# Patient Record
Sex: FEMALE | Race: WHITE | NOT HISPANIC OR LATINO | Employment: OTHER | URBAN - METROPOLITAN AREA
[De-identification: names, ages, dates, MRNs, and addresses within clinical notes are randomized per-mention and may not be internally consistent; named-entity substitution may affect disease eponyms.]

---

## 2024-05-15 ENCOUNTER — APPOINTMENT (OUTPATIENT)
Dept: RADIOLOGY | Facility: HOSPITAL | Age: 89
DRG: 690 | End: 2024-05-15
Payer: MEDICARE

## 2024-05-15 ENCOUNTER — HOSPITAL ENCOUNTER (INPATIENT)
Facility: HOSPITAL | Age: 89
LOS: 3 days | Discharge: HOME | DRG: 690 | End: 2024-05-18
Attending: EMERGENCY MEDICINE | Admitting: FAMILY MEDICINE
Payer: MEDICARE

## 2024-05-15 DIAGNOSIS — L02.419 THIGH ABSCESS: ICD-10-CM

## 2024-05-15 DIAGNOSIS — N39.0 URINARY TRACT INFECTION WITHOUT HEMATURIA, SITE UNSPECIFIED: ICD-10-CM

## 2024-05-15 DIAGNOSIS — R41.82 ALTERED MENTAL STATUS, UNSPECIFIED ALTERED MENTAL STATUS TYPE: Primary | ICD-10-CM

## 2024-05-15 LAB
ALBUMIN SERPL BCP-MCNC: 3.6 G/DL (ref 3.4–5)
ALP SERPL-CCNC: 71 U/L (ref 33–136)
ALT SERPL W P-5'-P-CCNC: 32 U/L (ref 7–45)
ANION GAP SERPL CALC-SCNC: 14 MMOL/L (ref 10–20)
APPEARANCE UR: ABNORMAL
AST SERPL W P-5'-P-CCNC: 36 U/L (ref 9–39)
BACTERIA #/AREA URNS AUTO: ABNORMAL /HPF
BASOPHILS # BLD AUTO: 0.06 X10*3/UL (ref 0–0.1)
BASOPHILS NFR BLD AUTO: 0.5 %
BILIRUB SERPL-MCNC: 0.9 MG/DL (ref 0–1.2)
BILIRUB UR STRIP.AUTO-MCNC: NEGATIVE MG/DL
BUN SERPL-MCNC: 19 MG/DL (ref 6–23)
CALCIUM SERPL-MCNC: 8.1 MG/DL (ref 8.6–10.3)
CARDIAC TROPONIN I PNL SERPL HS: 11 NG/L (ref 0–13)
CARDIAC TROPONIN I PNL SERPL HS: 15 NG/L (ref 0–13)
CHLORIDE SERPL-SCNC: 103 MMOL/L (ref 98–107)
CO2 SERPL-SCNC: 26 MMOL/L (ref 21–32)
COLOR UR: ABNORMAL
CREAT SERPL-MCNC: 0.81 MG/DL (ref 0.5–1.05)
EGFRCR SERPLBLD CKD-EPI 2021: 68 ML/MIN/1.73M*2
EOSINOPHIL # BLD AUTO: 0.12 X10*3/UL (ref 0–0.4)
EOSINOPHIL NFR BLD AUTO: 0.9 %
ERYTHROCYTE [DISTWIDTH] IN BLOOD BY AUTOMATED COUNT: 15.1 % (ref 11.5–14.5)
GLUCOSE BLD MANUAL STRIP-MCNC: 172 MG/DL (ref 74–99)
GLUCOSE SERPL-MCNC: 133 MG/DL (ref 74–99)
GLUCOSE UR STRIP.AUTO-MCNC: NORMAL MG/DL
HCT VFR BLD AUTO: 38.8 % (ref 36–46)
HGB BLD-MCNC: 12.5 G/DL (ref 12–16)
IMM GRANULOCYTES # BLD AUTO: 0.05 X10*3/UL (ref 0–0.5)
IMM GRANULOCYTES NFR BLD AUTO: 0.4 % (ref 0–0.9)
INR PPP: 0.9 (ref 0.9–1.1)
KETONES UR STRIP.AUTO-MCNC: NEGATIVE MG/DL
LACTATE SERPL-SCNC: 1.6 MMOL/L (ref 0.4–2)
LEUKOCYTE ESTERASE UR QL STRIP.AUTO: ABNORMAL
LYMPHOCYTES # BLD AUTO: 3.09 X10*3/UL (ref 0.8–3)
LYMPHOCYTES NFR BLD AUTO: 23.7 %
MCH RBC QN AUTO: 28.8 PG (ref 26–34)
MCHC RBC AUTO-ENTMCNC: 32.2 G/DL (ref 32–36)
MCV RBC AUTO: 89 FL (ref 80–100)
MONOCYTES # BLD AUTO: 0.89 X10*3/UL (ref 0.05–0.8)
MONOCYTES NFR BLD AUTO: 6.8 %
NEUTROPHILS # BLD AUTO: 8.81 X10*3/UL (ref 1.6–5.5)
NEUTROPHILS NFR BLD AUTO: 67.7 %
NITRITE UR QL STRIP.AUTO: ABNORMAL
NRBC BLD-RTO: 0 /100 WBCS (ref 0–0)
PH UR STRIP.AUTO: 6 [PH]
PLATELET # BLD AUTO: 292 X10*3/UL (ref 150–450)
POTASSIUM SERPL-SCNC: 4.7 MMOL/L (ref 3.5–5.3)
PROT SERPL-MCNC: 7 G/DL (ref 6.4–8.2)
PROT UR STRIP.AUTO-MCNC: ABNORMAL MG/DL
PROTHROMBIN TIME: 10.2 SECONDS (ref 9.8–12.8)
RBC # BLD AUTO: 4.34 X10*6/UL (ref 4–5.2)
RBC # UR STRIP.AUTO: ABNORMAL /UL
RBC #/AREA URNS AUTO: >20 /HPF
SODIUM SERPL-SCNC: 138 MMOL/L (ref 136–145)
SP GR UR STRIP.AUTO: 1.04
UROBILINOGEN UR STRIP.AUTO-MCNC: NORMAL MG/DL
WBC # BLD AUTO: 13 X10*3/UL (ref 4.4–11.3)
WBC #/AREA URNS AUTO: >50 /HPF
WBC CLUMPS #/AREA URNS AUTO: ABNORMAL /HPF

## 2024-05-15 PROCEDURE — 2550000001 HC RX 255 CONTRASTS: Performed by: EMERGENCY MEDICINE

## 2024-05-15 PROCEDURE — 1100000001 HC PRIVATE ROOM DAILY

## 2024-05-15 PROCEDURE — 84484 ASSAY OF TROPONIN QUANT: CPT | Mod: 91 | Performed by: EMERGENCY MEDICINE

## 2024-05-15 PROCEDURE — 36415 COLL VENOUS BLD VENIPUNCTURE: CPT | Performed by: EMERGENCY MEDICINE

## 2024-05-15 PROCEDURE — 87040 BLOOD CULTURE FOR BACTERIA: CPT | Mod: 91,AHULAB | Performed by: EMERGENCY MEDICINE

## 2024-05-15 PROCEDURE — 73701 CT LOWER EXTREMITY W/DYE: CPT | Mod: LEFT SIDE | Performed by: STUDENT IN AN ORGANIZED HEALTH CARE EDUCATION/TRAINING PROGRAM

## 2024-05-15 PROCEDURE — 82947 ASSAY GLUCOSE BLOOD QUANT: CPT

## 2024-05-15 PROCEDURE — 73701 CT LOWER EXTREMITY W/DYE: CPT | Mod: 52,LT

## 2024-05-15 PROCEDURE — 83605 ASSAY OF LACTIC ACID: CPT | Performed by: EMERGENCY MEDICINE

## 2024-05-15 PROCEDURE — 99285 EMERGENCY DEPT VISIT HI MDM: CPT | Mod: 25

## 2024-05-15 PROCEDURE — 70450 CT HEAD/BRAIN W/O DYE: CPT | Performed by: RADIOLOGY

## 2024-05-15 PROCEDURE — 85025 COMPLETE CBC W/AUTO DIFF WBC: CPT | Performed by: EMERGENCY MEDICINE

## 2024-05-15 PROCEDURE — 81001 URINALYSIS AUTO W/SCOPE: CPT | Performed by: EMERGENCY MEDICINE

## 2024-05-15 PROCEDURE — 81003 URINALYSIS AUTO W/O SCOPE: CPT | Performed by: EMERGENCY MEDICINE

## 2024-05-15 PROCEDURE — 85610 PROTHROMBIN TIME: CPT | Performed by: EMERGENCY MEDICINE

## 2024-05-15 PROCEDURE — 80053 COMPREHEN METABOLIC PANEL: CPT | Performed by: EMERGENCY MEDICINE

## 2024-05-15 PROCEDURE — 70450 CT HEAD/BRAIN W/O DYE: CPT

## 2024-05-15 PROCEDURE — 2500000004 HC RX 250 GENERAL PHARMACY W/ HCPCS (ALT 636 FOR OP/ED): Performed by: EMERGENCY MEDICINE

## 2024-05-15 PROCEDURE — 73701 CT LOWER EXTREMITY W/DYE: CPT | Mod: RT

## 2024-05-15 PROCEDURE — 84484 ASSAY OF TROPONIN QUANT: CPT | Performed by: EMERGENCY MEDICINE

## 2024-05-15 RX ORDER — VANCOMYCIN HYDROCHLORIDE 750 MG/150ML
750 INJECTION, SOLUTION INTRAVENOUS ONCE
Status: COMPLETED | OUTPATIENT
Start: 2024-05-15 | End: 2024-05-15

## 2024-05-15 RX ADMIN — IOHEXOL 75 ML: 350 INJECTION, SOLUTION INTRAVENOUS at 17:10

## 2024-05-15 RX ADMIN — PIPERACILLIN SODIUM AND TAZOBACTAM SODIUM 4.5 G: 4; .5 INJECTION, SOLUTION INTRAVENOUS at 21:18

## 2024-05-15 RX ADMIN — VANCOMYCIN HYDROCHLORIDE 750 MG: 750 INJECTION, SOLUTION INTRAVENOUS at 21:59

## 2024-05-15 ASSESSMENT — COLUMBIA-SUICIDE SEVERITY RATING SCALE - C-SSRS
2. HAVE YOU ACTUALLY HAD ANY THOUGHTS OF KILLING YOURSELF?: NO
1. IN THE PAST MONTH, HAVE YOU WISHED YOU WERE DEAD OR WISHED YOU COULD GO TO SLEEP AND NOT WAKE UP?: NO
6. HAVE YOU EVER DONE ANYTHING, STARTED TO DO ANYTHING, OR PREPARED TO DO ANYTHING TO END YOUR LIFE?: NO

## 2024-05-15 ASSESSMENT — PAIN - FUNCTIONAL ASSESSMENT: PAIN_FUNCTIONAL_ASSESSMENT: 0-10

## 2024-05-15 NOTE — ED TRIAGE NOTES
PT TO ED FROM HOME WITH C/O NEW ONSET SLURRED SPEECH, ALTERED MENTAL STATUS, AND SYNCOPAL EPISODE TODAY. LKW LAST PM. PT RECENTLY TREATED FOR UTI 1 WEEK PRIOR COMPLETING ABX MEDICATIONS.

## 2024-05-15 NOTE — ED PROVIDER NOTES
HPI   Chief Complaint   Patient presents with    SLURRED SPEACH    Syncope       HPI  Patient is a 89-year-old female with past medical history significant for recurrent UTIs, thyroid disease, dementia who is minimally verbal at baseline and presented to the emergency room with a chief complaint of difficulty finding her words and change in mental status.  The caregiver provides most of the history and states that patient speaks very little but when she does she can usually understand that things she is saying.  This morning she stated that her speech was slurred but in reality patient was not making any sense with the words that were coming out.  Last known well was last night.  She had a UTI a week ago and has since finished her antibiotics.  Additionally, she noted swelling to the left medial thigh that she believes to be new.  Denies any other symptoms at this time as best that she can convey.      PMHx: As above  PSHx: Denies pertinent  FamilyHx: Denies  SocialHx: Denies pertinent  Allergies: Per EMR  Medications: See Medication Reconciliation     ROS  As above otherwise denies      Physical Exam    GENERAL: Awake and Alert, No Acute Distress  HEENT: AT/NC, PERRL, EOMI, Normal Oropharynx, No Signs of Dehydration  NECK: Normal Inspection, No JVD  CARDIOVASCULAR: RRR, No M/R/G  RESPIRATORY: CTA Bilaterally, No Wheezes, Rales or Rhonchi, Chest Wall Non-tender  ABDOMEN: Soft, non-tender abdomen, Normal Bowel Sounds, No Distention  BACK: No CVA Tenderness  SKIN: Normal Color, Warm, Dry, No Rashes   EXTREMITIES: Does have a swelling to the medial aspect of the left thigh that does not appear to be tender.  Non-Tender, Full ROM, No Pedal Edema  NEURO: Nonverbal but reaches for her doll when removed.  Not following commands.    Nursing Assessment and Vitals Reviewed    EKG on arrival showed a normal sinus rhythm at 88 bpm.  There are T wave inversions in lead V2 and III.  There is left axis deviation.    Medical  Decision  Patient is a 89-year-old female with past medical history significant for recurrent UTIs, thyroid disease, dementia who is minimally verbal at baseline and presented to the emergency room with a chief complaint of difficulty finding her words and change in mental status.  The caregiver provides most of the history and states that patient speaks very little but when she does she can usually understand that things she is saying.  This morning she stated that her speech was slurred but in reality patient was not making any sense with the words that were coming out.  Last known well was last night.  She had a UTI a week ago and has since finished her antibiotics.  Additionally, she noted swelling to the left medial thigh that she believes to be new.  Denies any other symptoms at this time as best that she can convey.    Evaluated patient in triage room 3 at 1350 at which time a stroke alert was called.  Patient is nonverbal but does reach for her doll when it is removed from her side.  She is not following commands but inconsistently moves the lower extremities.  After being taken to the room I did evaluate patient thigh and it appears to have some swelling without any warmth, erythema or tenderness to palpation.  CT is ordered and currently pending.  Patient is not a candidate for TNK at this time as her last known well exceeds treatment window.    Workup for patient included labs that revealed a troponin of 15 with a delta at 11.  She has leukocytosis with a left shift.  CT head showed no acute emergent findings.  Patient is signed out to oncoming physician pending CT of the left lower extremity to evaluate for notable swelling.  Anticipate she will be admitted for further workup and management.                            [place] Coma Scale Score: 14                     Patient History   No past medical history on file.  No past surgical history on file.  No family history on file.  Social History      Tobacco Use    Smoking status: Not on file    Smokeless tobacco: Not on file   Substance Use Topics    Alcohol use: Not on file    Drug use: Not on file       Physical Exam   ED Triage Vitals   Temperature Heart Rate Respirations BP   05/15/24 1353 05/15/24 1353 05/15/24 1353 05/15/24 1353   37 °C (98.6 °F) 78 17 129/90      Pulse Ox Temp src Heart Rate Source Patient Position   05/15/24 1353 -- 05/15/24 1501 --   100 %  Monitor       BP Location FiO2 (%)     -- --             Physical Exam    ED Course & MDM   Diagnoses as of 05/20/24 0911   Altered mental status, unspecified altered mental status type   Urinary tract infection without hematuria, site unspecified   Thigh abscess       Medical Decision Making      Procedure  Procedures     Yanet Perry MD  05/20/24 0912

## 2024-05-16 PROBLEM — L02.419 THIGH ABSCESS: Status: ACTIVE | Noted: 2024-05-16

## 2024-05-16 LAB
ANION GAP SERPL CALC-SCNC: 13 MMOL/L (ref 10–20)
BUN SERPL-MCNC: 16 MG/DL (ref 6–23)
CALCIUM SERPL-MCNC: 7.9 MG/DL (ref 8.6–10.3)
CHLORIDE SERPL-SCNC: 106 MMOL/L (ref 98–107)
CO2 SERPL-SCNC: 23 MMOL/L (ref 21–32)
CREAT SERPL-MCNC: 0.88 MG/DL (ref 0.5–1.05)
CRP SERPL-MCNC: 4.07 MG/DL
EGFRCR SERPLBLD CKD-EPI 2021: 62 ML/MIN/1.73M*2
ERYTHROCYTE [DISTWIDTH] IN BLOOD BY AUTOMATED COUNT: 15.2 % (ref 11.5–14.5)
ERYTHROCYTE [SEDIMENTATION RATE] IN BLOOD BY WESTERGREN METHOD: 49 MM/H (ref 0–30)
GLUCOSE SERPL-MCNC: 94 MG/DL (ref 74–99)
HCT VFR BLD AUTO: 35.4 % (ref 36–46)
HGB BLD-MCNC: 11.3 G/DL (ref 12–16)
MCH RBC QN AUTO: 28.9 PG (ref 26–34)
MCHC RBC AUTO-ENTMCNC: 31.9 G/DL (ref 32–36)
MCV RBC AUTO: 91 FL (ref 80–100)
NRBC BLD-RTO: 0.7 /100 WBCS (ref 0–0)
PLATELET # BLD AUTO: 247 X10*3/UL (ref 150–450)
POTASSIUM SERPL-SCNC: 4.2 MMOL/L (ref 3.5–5.3)
RBC # BLD AUTO: 3.91 X10*6/UL (ref 4–5.2)
SODIUM SERPL-SCNC: 138 MMOL/L (ref 136–145)
WBC # BLD AUTO: 15.1 X10*3/UL (ref 4.4–11.3)

## 2024-05-16 PROCEDURE — 2500000004 HC RX 250 GENERAL PHARMACY W/ HCPCS (ALT 636 FOR OP/ED): Performed by: PHARMACIST

## 2024-05-16 PROCEDURE — 2500000006 HC RX 250 W HCPCS SELF ADMINISTERED DRUGS (ALT 637 FOR ALL PAYERS): Mod: MUE | Performed by: FAMILY MEDICINE

## 2024-05-16 PROCEDURE — 36415 COLL VENOUS BLD VENIPUNCTURE: CPT | Performed by: NURSE PRACTITIONER

## 2024-05-16 PROCEDURE — 80048 BASIC METABOLIC PNL TOTAL CA: CPT | Performed by: FAMILY MEDICINE

## 2024-05-16 PROCEDURE — 85027 COMPLETE CBC AUTOMATED: CPT | Performed by: FAMILY MEDICINE

## 2024-05-16 PROCEDURE — 92610 EVALUATE SWALLOWING FUNCTION: CPT | Mod: GN

## 2024-05-16 PROCEDURE — 1100000001 HC PRIVATE ROOM DAILY

## 2024-05-16 PROCEDURE — 2500000004 HC RX 250 GENERAL PHARMACY W/ HCPCS (ALT 636 FOR OP/ED): Performed by: FAMILY MEDICINE

## 2024-05-16 PROCEDURE — 36415 COLL VENOUS BLD VENIPUNCTURE: CPT | Performed by: FAMILY MEDICINE

## 2024-05-16 PROCEDURE — 99221 1ST HOSP IP/OBS SF/LOW 40: CPT

## 2024-05-16 PROCEDURE — C9113 INJ PANTOPRAZOLE SODIUM, VIA: HCPCS | Performed by: FAMILY MEDICINE

## 2024-05-16 PROCEDURE — 2500000001 HC RX 250 WO HCPCS SELF ADMINISTERED DRUGS (ALT 637 FOR MEDICARE OP): Performed by: FAMILY MEDICINE

## 2024-05-16 PROCEDURE — 86140 C-REACTIVE PROTEIN: CPT | Performed by: NURSE PRACTITIONER

## 2024-05-16 PROCEDURE — 99222 1ST HOSP IP/OBS MODERATE 55: CPT | Performed by: STUDENT IN AN ORGANIZED HEALTH CARE EDUCATION/TRAINING PROGRAM

## 2024-05-16 PROCEDURE — 85652 RBC SED RATE AUTOMATED: CPT | Performed by: NURSE PRACTITIONER

## 2024-05-16 RX ORDER — SODIUM CHLORIDE, SODIUM LACTATE, POTASSIUM CHLORIDE, CALCIUM CHLORIDE 600; 310; 30; 20 MG/100ML; MG/100ML; MG/100ML; MG/100ML
75 INJECTION, SOLUTION INTRAVENOUS CONTINUOUS
Status: ACTIVE | OUTPATIENT
Start: 2024-05-16 | End: 2024-05-16

## 2024-05-16 RX ORDER — ACETAMINOPHEN 325 MG/1
650 TABLET ORAL EVERY 4 HOURS PRN
Status: DISCONTINUED | OUTPATIENT
Start: 2024-05-16 | End: 2024-05-18 | Stop reason: HOSPADM

## 2024-05-16 RX ORDER — TALC
3 POWDER (GRAM) TOPICAL NIGHTLY
Status: DISCONTINUED | OUTPATIENT
Start: 2024-05-16 | End: 2024-05-18 | Stop reason: HOSPADM

## 2024-05-16 RX ORDER — ACETAMINOPHEN 160 MG/5ML
650 SOLUTION ORAL EVERY 4 HOURS PRN
Status: DISCONTINUED | OUTPATIENT
Start: 2024-05-16 | End: 2024-05-18 | Stop reason: HOSPADM

## 2024-05-16 RX ORDER — LEVOTHYROXINE SODIUM 25 UG/1
25 TABLET ORAL DAILY
Status: DISCONTINUED | OUTPATIENT
Start: 2024-05-16 | End: 2024-05-18 | Stop reason: HOSPADM

## 2024-05-16 RX ORDER — OLANZAPINE 2.5 MG/1
2.5 TABLET ORAL NIGHTLY
Status: DISCONTINUED | OUTPATIENT
Start: 2024-05-16 | End: 2024-05-18 | Stop reason: HOSPADM

## 2024-05-16 RX ORDER — HEPARIN SODIUM 5000 [USP'U]/ML
5000 INJECTION, SOLUTION INTRAVENOUS; SUBCUTANEOUS EVERY 8 HOURS SCHEDULED
Status: DISCONTINUED | OUTPATIENT
Start: 2024-05-16 | End: 2024-05-18 | Stop reason: HOSPADM

## 2024-05-16 RX ORDER — PANTOPRAZOLE SODIUM 40 MG/1
40 TABLET, DELAYED RELEASE ORAL
Status: DISCONTINUED | OUTPATIENT
Start: 2024-05-16 | End: 2024-05-18 | Stop reason: HOSPADM

## 2024-05-16 RX ORDER — ACETAMINOPHEN 650 MG/1
650 SUPPOSITORY RECTAL EVERY 4 HOURS PRN
Status: DISCONTINUED | OUTPATIENT
Start: 2024-05-16 | End: 2024-05-18 | Stop reason: HOSPADM

## 2024-05-16 RX ORDER — PANTOPRAZOLE SODIUM 40 MG/10ML
40 INJECTION, POWDER, LYOPHILIZED, FOR SOLUTION INTRAVENOUS
Status: DISCONTINUED | OUTPATIENT
Start: 2024-05-16 | End: 2024-05-18 | Stop reason: HOSPADM

## 2024-05-16 RX ORDER — GUAIFENESIN 600 MG/1
600 TABLET, EXTENDED RELEASE ORAL EVERY 12 HOURS PRN
Status: DISCONTINUED | OUTPATIENT
Start: 2024-05-16 | End: 2024-05-18 | Stop reason: HOSPADM

## 2024-05-16 RX ORDER — HEPARIN SODIUM 5000 [USP'U]/ML
5000 INJECTION, SOLUTION INTRAVENOUS; SUBCUTANEOUS EVERY 12 HOURS
Status: DISCONTINUED | OUTPATIENT
Start: 2024-05-16 | End: 2024-05-16 | Stop reason: ENTERED-IN-ERROR

## 2024-05-16 RX ORDER — ONDANSETRON 4 MG/1
4 TABLET, FILM COATED ORAL EVERY 8 HOURS PRN
Status: DISCONTINUED | OUTPATIENT
Start: 2024-05-16 | End: 2024-05-18 | Stop reason: HOSPADM

## 2024-05-16 RX ORDER — ONDANSETRON HYDROCHLORIDE 2 MG/ML
4 INJECTION, SOLUTION INTRAVENOUS EVERY 8 HOURS PRN
Status: DISCONTINUED | OUTPATIENT
Start: 2024-05-16 | End: 2024-05-18 | Stop reason: HOSPADM

## 2024-05-16 RX ORDER — FLUOXETINE HYDROCHLORIDE 20 MG/1
20 CAPSULE ORAL DAILY
Status: DISCONTINUED | OUTPATIENT
Start: 2024-05-16 | End: 2024-05-18 | Stop reason: HOSPADM

## 2024-05-16 RX ORDER — AMOXICILLIN 250 MG
2 CAPSULE ORAL NIGHTLY PRN
Status: DISCONTINUED | OUTPATIENT
Start: 2024-05-16 | End: 2024-05-18 | Stop reason: HOSPADM

## 2024-05-16 RX ADMIN — OLANZAPINE 2.5 MG: 2.5 TABLET, FILM COATED ORAL at 20:12

## 2024-05-16 RX ADMIN — Medication 3 MG: at 20:12

## 2024-05-16 RX ADMIN — PIPERACILLIN SODIUM AND TAZOBACTAM SODIUM 3.38 G: 3; .375 INJECTION, SOLUTION INTRAVENOUS at 15:52

## 2024-05-16 RX ADMIN — SODIUM CHLORIDE, POTASSIUM CHLORIDE, SODIUM LACTATE AND CALCIUM CHLORIDE 75 ML/HR: 600; 310; 30; 20 INJECTION, SOLUTION INTRAVENOUS at 11:08

## 2024-05-16 RX ADMIN — SODIUM CHLORIDE 80 ML: 9 INJECTION, SOLUTION INTRAVENOUS at 01:38

## 2024-05-16 RX ADMIN — PIPERACILLIN SODIUM AND TAZOBACTAM SODIUM 3.38 G: 3; .375 INJECTION, SOLUTION INTRAVENOUS at 11:07

## 2024-05-16 RX ADMIN — HEPARIN SODIUM 5000 UNITS: 5000 INJECTION INTRAVENOUS; SUBCUTANEOUS at 13:09

## 2024-05-16 RX ADMIN — PIPERACILLIN SODIUM AND TAZOBACTAM SODIUM 3.38 G: 3; .375 INJECTION, SOLUTION INTRAVENOUS at 03:17

## 2024-05-16 RX ADMIN — HEPARIN SODIUM 5000 UNITS: 5000 INJECTION INTRAVENOUS; SUBCUTANEOUS at 21:34

## 2024-05-16 RX ADMIN — PIPERACILLIN SODIUM AND TAZOBACTAM SODIUM 3.38 G: 3; .375 INJECTION, SOLUTION INTRAVENOUS at 20:13

## 2024-05-16 RX ADMIN — HEPARIN SODIUM 5000 UNITS: 5000 INJECTION INTRAVENOUS; SUBCUTANEOUS at 06:06

## 2024-05-16 RX ADMIN — PANTOPRAZOLE SODIUM 40 MG: 40 INJECTION, POWDER, FOR SOLUTION INTRAVENOUS at 11:07

## 2024-05-16 SDOH — SOCIAL STABILITY: SOCIAL INSECURITY: ARE THERE ANY APPARENT SIGNS OF INJURIES/BEHAVIORS THAT COULD BE RELATED TO ABUSE/NEGLECT?: YES

## 2024-05-16 SDOH — SOCIAL STABILITY: SOCIAL INSECURITY: ABUSE: ADULT

## 2024-05-16 SDOH — SOCIAL STABILITY: SOCIAL INSECURITY: ARE YOU OR HAVE YOU BEEN THREATENED OR ABUSED PHYSICALLY, EMOTIONALLY, OR SEXUALLY BY ANYONE?: UNABLE TO ASSESS

## 2024-05-16 SDOH — SOCIAL STABILITY: SOCIAL INSECURITY: DOES ANYONE TRY TO KEEP YOU FROM HAVING/CONTACTING OTHER FRIENDS OR DOING THINGS OUTSIDE YOUR HOME?: UNABLE TO ASSESS

## 2024-05-16 SDOH — SOCIAL STABILITY: SOCIAL INSECURITY: WERE YOU ABLE TO COMPLETE ALL THE BEHAVIORAL HEALTH SCREENINGS?: NO

## 2024-05-16 SDOH — SOCIAL STABILITY: SOCIAL INSECURITY: HAVE YOU HAD ANY THOUGHTS OF HARMING ANYONE ELSE?: UNABLE TO ASSESS

## 2024-05-16 SDOH — SOCIAL STABILITY: SOCIAL INSECURITY: HAVE YOU HAD THOUGHTS OF HARMING ANYONE ELSE?: UNABLE TO ASSESS

## 2024-05-16 SDOH — SOCIAL STABILITY: SOCIAL INSECURITY: HAS ANYONE EVER THREATENED TO HURT YOUR FAMILY OR YOUR PETS?: UNABLE TO ASSESS

## 2024-05-16 SDOH — SOCIAL STABILITY: SOCIAL INSECURITY: DO YOU FEEL ANYONE HAS EXPLOITED OR TAKEN ADVANTAGE OF YOU FINANCIALLY OR OF YOUR PERSONAL PROPERTY?: UNABLE TO ASSESS

## 2024-05-16 SDOH — SOCIAL STABILITY: SOCIAL INSECURITY: DO YOU FEEL UNSAFE GOING BACK TO THE PLACE WHERE YOU ARE LIVING?: UNABLE TO ASSESS

## 2024-05-16 ASSESSMENT — ACTIVITIES OF DAILY LIVING (ADL)
BATHING: DEPENDENT
DRESSING YOURSELF: DEPENDENT
PATIENT'S MEMORY ADEQUATE TO SAFELY COMPLETE DAILY ACTIVITIES?: UNABLE TO ASSESS
WALKS IN HOME: DEPENDENT
LACK_OF_TRANSPORTATION: NO
GROOMING: DEPENDENT
FEEDING YOURSELF: DEPENDENT
HEARING - RIGHT EAR: UNABLE TO ASSESS
HEARING - LEFT EAR: UNABLE TO ASSESS
TOILETING: DEPENDENT
LACK_OF_TRANSPORTATION: PATIENT UNABLE TO ANSWER
ASSISTIVE_DEVICE: WHEELCHAIR
JUDGMENT_ADEQUATE_SAFELY_COMPLETE_DAILY_ACTIVITIES: UNABLE TO ASSESS
ADEQUATE_TO_COMPLETE_ADL: UNABLE TO ASSESS

## 2024-05-16 ASSESSMENT — COGNITIVE AND FUNCTIONAL STATUS - GENERAL
MOVING TO AND FROM BED TO CHAIR: A LOT
MOVING FROM LYING ON BACK TO SITTING ON SIDE OF FLAT BED WITH BEDRAILS: A LOT
CLIMB 3 TO 5 STEPS WITH RAILING: TOTAL
PATIENT BASELINE BEDBOUND: UNABLE TO ASSESS AT THIS TIME
STANDING UP FROM CHAIR USING ARMS: A LOT
MOBILITY SCORE: 10
TURNING FROM BACK TO SIDE WHILE IN FLAT BAD: A LOT
WALKING IN HOSPITAL ROOM: TOTAL

## 2024-05-16 ASSESSMENT — LIFESTYLE VARIABLES
HOW MANY STANDARD DRINKS CONTAINING ALCOHOL DO YOU HAVE ON A TYPICAL DAY: PATIENT UNABLE TO ANSWER
HOW OFTEN DO YOU HAVE A DRINK CONTAINING ALCOHOL: PATIENT UNABLE TO ANSWER
SKIP TO QUESTIONS 9-10: 0
HOW OFTEN DO YOU HAVE 6 OR MORE DRINKS ON ONE OCCASION: PATIENT UNABLE TO ANSWER
AUDIT-C TOTAL SCORE: -1
AUDIT-C TOTAL SCORE: -1

## 2024-05-16 ASSESSMENT — PAIN - FUNCTIONAL ASSESSMENT: PAIN_FUNCTIONAL_ASSESSMENT: UNABLE TO SELF-REPORT

## 2024-05-16 ASSESSMENT — PATIENT HEALTH QUESTIONNAIRE - PHQ9
SUM OF ALL RESPONSES TO PHQ9 QUESTIONS 1 & 2: 0
1. LITTLE INTEREST OR PLEASURE IN DOING THINGS: NOT AT ALL
2. FEELING DOWN, DEPRESSED OR HOPELESS: NOT AT ALL

## 2024-05-16 NOTE — PROGRESS NOTES
05/16/24 1049   Discharge Planning   Patient expects to be discharged to: [facility] met with pt son Barry and pt caregiver Mar was also at bedside. Barry states pt caregiver Mar is there 24/7. Mar states that she is still able to care and meet all pt needs. Barry asked about pt hospice eligibility. KARO sent message to [facility] re: palliative referral for hospice eligibility.

## 2024-05-16 NOTE — PROGRESS NOTES
05/16/24 0824   Discharge Planning   Living Arrangements Family members   Support Systems Family members   Assistance Needed ADL's ambulating bathing, dressing   Type of Residence Private residence   Do you have animals or pets at home? No   Who is requesting discharge planning? Patient   Home or Post Acute Services None   Patient expects to be discharged to: SNF vs LTC   Does the patient need discharge transport arranged? Yes   RoundTrip coordination needed? Yes   Has discharge transport been arranged? No   Financial Resource Strain   How hard is it for you to pay for the very basics like food, housing, medical care, and heating? Not very   Housing Stability   In the last 12 months, was there a time when you were not able to pay the mortgage or rent on time? N   In the last 12 months, was there a time when you did not have a steady place to sleep or slept in a shelter (including now)? N   Transportation Needs   In the past 12 months, has lack of transportation kept you from medical appointments or from getting medications? no   In the past 12 months, has lack of transportation kept you from meetings, work, or from getting things needed for daily living? No   Patient Choice   Provider Choice list and CMS website (https://medicare.gov/care-compare#search) for post-acute Quality and Resource Measure Data were provided and reviewed with: Patient     I met with this patient at her bedside, she is alertx0 I called the patient's son at [phone] and left him a message to call me back so I can establish a baseline for the patient prior to adm, patient will most likely need SNF placement on discharge.

## 2024-05-16 NOTE — CONSULTS
"Consults    Reason For Consult  \"Abscess\"    History Of Present Illness  Crystal Wilkins is a 89 y.o. female presenting with a past medical history of RA, depression, HTN, GERD, hypothyroidism, dementia, COPD and hyperlipidemia. Presented to [facility] for increased lethargy, UTI and left thigh pain. CT of the left femur is significant for a fluid collection within the adductor muscle, she had an elevated WBC count of 13 and a positive UTI. Ortho saw the patient and recommended an aspiration vs a drain placement into this left thigh fluid collection. IR consulted for the above.     Patient confused and lethargic, unable to participate in the HPI. I talked with the patient's caregiver at length. She reports her symptoms started 3 days ago with increased weakness and lethargy. She then reports she found her left leg over the bed rail, normally she has a pool noodle to protect her from injury but the noodle was also gone when the care giver went to check on her. She reports this is when the leg pain started. She then noticed some swelling to this area. She brought her into the hospital because she was concerned she was having a \"stroke\" because of her increased lethargy, fatigue and was not sure of the leg was attributing to her symptoms.      Past Medical History  She has no past medical history on file.    Surgical History  She has no past surgical history on file.     Social History  She has no history on file for tobacco use, alcohol use, and drug use.    Family History  No family history on file.     Allergies  Patient has no known allergies.    MEDS:    Current Facility-Administered Medications:     acetaminophen (Tylenol) tablet 650 mg, 650 mg, oral, q4h PRN **OR** acetaminophen (Tylenol) oral liquid 650 mg, 650 mg, nasogastric tube, q4h PRN **OR** acetaminophen (Tylenol) suppository 650 mg, 650 mg, rectal, q4h PRN, Kevon Serrano MD    acetaminophen (Tylenol) tablet 650 mg, 650 mg, oral, q4h PRN **OR** acetaminophen " (Tylenol) oral liquid 650 mg, 650 mg, oral, q4h PRN **OR** acetaminophen (Tylenol) suppository 650 mg, 650 mg, rectal, q4h PRN, Kevon Serrano MD    FLUoxetine (PROzac) capsule 20 mg, 20 mg, oral, Daily, Kevon Serrano MD    guaiFENesin (Mucinex) 12 hr tablet 600 mg, 600 mg, oral, q12h PRN, Kevon Serrano MD    heparin (porcine) injection 5,000 Units, 5,000 Units, subcutaneous, q8h NIHARIKA, Juan Aguero PharmD, 5,000 Units at 05/16/24 0606    lactated Ringer's infusion, 75 mL/hr, intravenous, Continuous, Kevon Serrano MD, Last Rate: 75 mL/hr at 05/16/24 1108, 75 mL/hr at 05/16/24 1108    levothyroxine (Synthroid, Levoxyl) tablet 25 mcg, 25 mcg, oral, Daily, Kevon Serrano MD    melatonin tablet 3 mg, 3 mg, oral, Nightly, Kevon Serrano MD    OLANZapine (ZyPREXA) tablet 2.5 mg, 2.5 mg, oral, Nightly, Kevon Serrano MD    ondansetron (Zofran) tablet 4 mg, 4 mg, oral, q8h PRN **OR** ondansetron (Zofran) injection 4 mg, 4 mg, intravenous, q8h PRN, Kevon Serrano MD    pantoprazole (ProtoNix) EC tablet 40 mg, 40 mg, oral, Daily before breakfast **OR** pantoprazole (ProtoNix) injection 40 mg, 40 mg, intravenous, Daily before breakfast, Kevon Serrano MD, 40 mg at 05/16/24 1107    piperacillin-tazobactam-dextrose (Zosyn) IV 3.375 g, 3.375 g, intravenous, q6h, Kevon Serrano MD, Last Rate: 100 mL/hr at 05/16/24 1107, 3.375 g at 05/16/24 1107    sennosides-docusate sodium (Kerline-Colace) 8.6-50 mg per tablet 2 tablet, 2 tablet, oral, Nightly PRN, Kevon Serrano MD    sodium chloride 0.9 % bolus 80 mL, 80 mL, intravenous, Once, Kevon Serrano MD, Last Rate: 8 mL/hr at 05/16/24 0138, 80 mL at 05/16/24 0138    Review of Systems  History obtained from caregiver Mar     Last Recorded Vitals  /86 (Patient Position: Lying)   Pulse 89   Temp 36.7 °C (98.1 °F) (Temporal)   Resp 20   Wt 52.6 kg (116 lb)   SpO2 90%      Physical Exam  HEENT: normocephalic, atraumatic  Pulm: clear to auscultation bilaterally, no wheezes,  good air entry  Cardiac: Regular rate and rhythm or without murmur or extra heart sounds  GI: soft, nontender, normal bowel sounds  Pulses: peripheral pulses symmetrical  Dermatologic: Purple, green area to the inner/posterior left thigh with swelling to the area. No evidence of redness or warmth to the touch.     Relevant Results    LABS:  Lab Results   Component Value Date    WBC 15.1 (H) 05/16/2024    HGB 11.3 (L) 05/16/2024    HCT 35.4 (L) 05/16/2024    MCV 91 05/16/2024     05/16/2024      Results from last 72 hours   Lab Units 05/15/24  1422   SODIUM mmol/L 138   POTASSIUM mmol/L 4.7   CHLORIDE mmol/L 103   CO2 mmol/L 26   BUN mg/dL 19   CREATININE mg/dL 0.81   GLUCOSE mg/dL 133*   CALCIUM mg/dL 8.1*   ANION GAP mmol/L 14   EGFR mL/min/1.73m*2 68     Results from last 72 hours   Lab Units 05/15/24  1422   ALK PHOS U/L 71   BILIRUBIN TOTAL mg/dL 0.9   PROTEIN TOTAL g/dL 7.0   ALT U/L 32   AST U/L 36   ALBUMIN g/dL 3.6     Results from last 72 hours   Lab Units 05/15/24  1438   INR  0.9       MICRO:  No results found for the last 14 days.      IMAGING:   CT femur left w IV contrast   Final Result   LEFT LOWER EXTREMITY:   1. Loculated rim enhancing fluid collection within the proximal left   adductor musculature measuring 15.3 cm x 5.5 cm x 3.5 cm with   locoregional edema/fat stranding in the overlying fat. Differential   diagnosis includes abscess with adjacent cellulitis or   sterile/infected subacute hematoma status post high-grade muscle   injury. Fluid sampling recommended.   2. Small left hip and left knee joint effusions without evidence of   osseous destruction or erosive change. These are accompanied by mild   hip and moderate knee osteoarthrosis.   3. No acute fracture of the left femur.        RIGHT LOWER EXTREMITY:   1. No acute osseous abnormality of the right femur.   2. Mild right hip osteoarthrosis and moderate right knee   osteoarthrosis without joint effusions.   3. No acute soft tissue  abnormality of the right thigh.             Other:   Large amount of impacted stool in the rectum, distended up to 8.5 x   6.9 cm.        MACRO:   None.        Signed by: Kishan Farfan 5/15/2024 8:01 PM   Dictation workstation:   [id number]      CT femur right w IV contrast   Final Result   LEFT LOWER EXTREMITY:   1. Loculated rim enhancing fluid collection within the proximal left   adductor musculature measuring 15.3 cm x 5.5 cm x 3.5 cm with   locoregional edema/fat stranding in the overlying fat. Differential   diagnosis includes abscess with adjacent cellulitis or   sterile/infected subacute hematoma status post high-grade muscle   injury. Fluid sampling recommended.   2. Small left hip and left knee joint effusions without evidence of   osseous destruction or erosive change. These are accompanied by mild   hip and moderate knee osteoarthrosis.   3. No acute fracture of the left femur.        RIGHT LOWER EXTREMITY:   1. No acute osseous abnormality of the right femur.   2. Mild right hip osteoarthrosis and moderate right knee   osteoarthrosis without joint effusions.   3. No acute soft tissue abnormality of the right thigh.             Other:   Large amount of impacted stool in the rectum, distended up to 8.5 x   6.9 cm.        MACRO:   None.        Signed by: Kishan Farfan 5/15/2024 8:01 PM   Dictation workstation:   [id number]      CT brain attack head wo IV contrast   Final Result   NEGATIVE BRAIN ATTACK PROTOCOL CT BRAIN:        NO ACUTE INTRACRANIAL HEMORRHAGE        NO ACUTE INTRACRANIAL MASS EFFECT        NO CT EVIDENCE OF A LARGE ACUTE TERRITORIAL INFARCT        I WAS ABLE TO COMMUNICATE THESE FINDINGS BY TELEPHONE TO DIEGO JACOBSEN AT 14 18 HOURS, SAME DAY, 15 MAY 2024        MACRO:   Con Harris discussed the significance and urgency of this critical   finding by telephone with  DIEGO CAMPOVERDE on 5/15/2024   at 2:18 pm.  (**-RCF-**) Findings:  See findings.         Signed by: Con Harris 5/15/2024 2:18 PM   Dictation workstation:   [id number]      Consult to Interventional Radiology    (Results Pending)          Assessment/Plan     This is a 89 y.o. female presenting with a past medical history of RA, depression, HTN, GERD, hypothyroidism, dementia, COPD and hyperlipidemia. Presented to [facility] [place] for increased lethargy, UTI and left thigh pain. CT of the left femur is significant for a fluid collection within the adductor muscle, she had an elevated WBC count of 13 and a positive UTI. Ortho saw the patient and recommended an aspiration vs a drain placement into this left thigh fluid collection. IR consulted for the above.     Patient clinically stable. I do believe this is a sterile fluid collection representing a hematoma given the ecchymosis and swelling. There is no clinical suspicion for an acute infection within the leg as there is no pain to the touch, redness or warmth. I do appreciate an elevated WBC count and lethargy which could be attributed to her UTI. No plan for IR drainage at this time.     Discussed plan of care with Dr. Serrano.    IR to sign off.    LIANET Jaramillo-CNP    Time : Billing Time  Prep time on date of the patient encounter: 15 minutes.   Time spent directly with patient/family/caregiver: 15 minutes.   Documentation time: 15 minutes.   Total time (minutes):  45 minutes  Time Spent with this Patient (minutes).  More than 50% of This Time was Spent in Counseling and/or Coordination of Care

## 2024-05-16 NOTE — PROGRESS NOTES
"ORTHOPAEDIC SURGERY INPATIENT PROGRESS NOTE    Subjective   NAEON. Patient appears to be resting comfortably in bed, minimally interactive on my evaluation of the patient this morning.  Orthopedic surgery was consulted with concern for left thigh swelling after a CT was obtained that was suggestive of fluid collection in abductor musculature.  Patient unable to provide history at this time.    Objective   PHYSICAL EXAMINATION  Constitutional Exam: Frail appearing  Psychiatric Exam: Minimally interactive on examination  Eye Exam: EOMI  Pulmonary Exam: breathing non-labored, no apparent distress  Lymphatic exam: no appreciable lymphadenopathy in the lower extremities  Cardiovascular exam: RRR to peripheral palpation, DP pulses 2+, PT 2+, toes are pink with good capillary refill, no pitting edema  Skin exam: no open lesions, rashes, abrasions or ulcerations  Neurological exam: sensation to light touch intact in both lower extremities in peripheral and dermatomal distributions (except for any abnormalities noted in musculoskeletal exam)    Musculoskeletal exam: Left lower extremity examination.  Patient with medial thigh compartment swelling, minimal tenderness to palpation.  There is some evident ecchymosis without erythema, induration, fluctuance or obvious drainage.  Neurosensory examination limited secondary to patient mental status.  Patient has palpable 1+ DP/PT pulses.    Last Recorded Vitals  Blood pressure 178/86, pulse (!) 118, temperature 36.7 °C (98.1 °F), temperature source Temporal, resp. rate 20, height 1.575 m (5' 2\"), weight 52.6 kg (116 lb), SpO2 90%.    Relevant Imaging  CT imaging left femur with contrast reviewed from 05/15/2024 and independently evaluated by me on 05/16/2024 demonstrates localized fluid collection within the medial compartment of the thigh.    Assessment/Plan:  89-year-old female with altered mental status who presents with left thigh swelling, CT concerning for fluid " collection.    - No acute orthopaedic surgery intervention  - NWB LLE  - PO Pain control, IV for breakthrough per primary team  - Recommend image guided aspiration of fluid collection and leave drain, please send cultures as well as cell count and Gram stain  - Add on ESR/CRP  - Encourage IS, supplemental O2 as needed  - DVT PPX: SCDs, early mobilization and chemoppx per primary team     Derek Armstrong MD, DELICIA  Department of Orthopaedic Surgery  [facility]

## 2024-05-16 NOTE — PROGRESS NOTES
Physical Therapy                 Therapy Communication Note    Patient Name: Crystal Wilkins  MRN: [id number]  Today's Date: 5/16/2024     Discipline: Physical Therapy    Missed Visit Reason: Missed Visit Reason: Other (Comment) (Attempted PT eval, but caregiver is not present. Pt is not following any commands, and is unable to participate at this time. Nurse aware.)    Missed Time: Attempt

## 2024-05-16 NOTE — PROGRESS NOTES
PALLIATIVE CARE SOCIAL WORK NOTE     Hospice order received today. Chart reviewed. From reading previous records from [facility], pt had been active with [facility], at some point in the past, stabilized, and is currently involved with the [facility] program. Per Dr Serrano, son questioning if she might be appropriate for hospice again now.  I attempted to reach pt's son Barry this afternoon. I have left a voice mail for him requesting return call.      KRISTY Dodd

## 2024-05-16 NOTE — PROGRESS NOTES
Speech-Language Pathology    Inpatient Speech-Language Pathology Clinical Swallow Evaluation    Patient Name: Crystal Wilkins  MRN: [id number]  : 1931  Today's Date: 24   Time Calculation  Start Time: 915  Stop Time: 946  Time Calculation (min): 31 min        RECOMMENDATIONS:    Solid Diet Recommendations :  Easy Chew  2.   Liquid Diet Recommendations: Thin (IDDSI Level 0)  3.   Medication Administration Recommendations: With Pureed  4.    NO FURTHER SLP SERVICES REQUIRED AT THIS TIME    Assessment:  Assessment Results: Patient fully alert with limited intelligible verbalizations due to cognitive impairment. Family reporting recent cognitive decline over past 2 months. Patient receiving 24 hour home care. HOB elevated to full upright position prior to assessment this date. Patient presents with an essentially functional swallowing mechanism given thin liquids, puree and solid boluses. Bolus formation appeared timely with adequate tolerance of ice chips, single water boluses and 3 oz thin liquid consumed continuously via straw. Patient consumed 10-12 oz water during assessment without overt s/s aspiration. Transitioned to purees and solids. Bolus manipulation timely with active mastication of solids noted. Full oral clearance achieved with boluses presented. Voice and respirations remained clear post swallows. No reported dysphagia prior to admission.     Baseline Assessment:  Respiratory Status: Room air  History of Intubation: No        Behavior/Cognition: Alert, Cooperative, Confused, Doesn't follow directions  Patient Positioning: Upright in Bed  Baseline Vocal Quality: Normal    Oral-Motor Assessment:  Dentition: Adequate/Natural  Oral Motor: Unable to Complete (does not follow commands for oral motor movements)    Plan:  SLP Plan: No skilled SLP        SLP Discharge Recommendations:  (No further SLP services as family considering hospice care due to recent cognitive decline.)  Discussed POC:  Caregiver/family  Discussed Risks/Benefits: Yes  Patient/Caregiver Agreeable: Yes    Goals:   N/A    General Visit Information:  Patient admitted: 5/15/24    Past Medical History: Dementia,     Chief Complaint/Reason for admission: Admitted to [facility] due to altered mental status, syncope and slurred speech. Abscess identified on left thigh.    Relevant Imaging Results: CT femur 5/15>Abscess L thigh. CT brain 5/15>negative    Living Environment: Home  Reason for Referral: assess for dysphagia  Ordering Physician: Dr. Serrano  Current Diet : NPO    Pain:  Pain Assessment: Unable to self-report       Treatment:    N/A    Inpatient Education:  Family/Caregiver  educated on S/S of aspiration to be aware of, risks of aspiration  Family/Caregiver education results: gave verbal understanding

## 2024-05-16 NOTE — PROGRESS NOTES
Emergency Medicine Transition of Care Note.    I received Crystal Wilkins in signout from Dr. Cornejo.  Please see the previous ED provider note for all HPI, PE and MDM up to the time of signout at 1600. This is in addition to the primary record.    In brief Crystal Wilkins is an 89 y.o. female presenting for   Chief Complaint   Patient presents with    SLURRED SPEACH    Syncope     At the time of signout we were awaiting: CT thigh    Diagnoses as of 05/15/24 2058   Altered mental status, unspecified altered mental status type   Urinary tract infection without hematuria, site unspecified   Thigh abscess       Medical Decision Making  This is a 89-year-old female who presented to the emergency department with altered mental status, syncope, slurred words.  She was evaluated as a stroke alert.  Care was transferred pending CT of the thigh.  The CT showed an abscess.  This was discussed with surgical RADHA.  Ortho recommends IR drainage. Contacted Dr. Serrano who accepted the patient for admission.        Final diagnoses:   [R41.82] Altered mental status, unspecified altered mental status type   [N39.0] Urinary tract infection without hematuria, site unspecified   [L02.419] Thigh abscess           Procedure  Procedures    Himanshu Haley MD

## 2024-05-16 NOTE — H&P
History Of Present Illness  Crystal Wilkins is a 89 y.o. female presenting with h/o advanced dementia, at baseline dependent in all adls, and incont of bladder and bowel   Came to ED due to worsening mental status over past 2 weeks  Recently treated for urine infction  In the ED didhave ct of thigh showing left adductor muscle fluid collection ? Abscess admitted for further eval   Pt does have pain in that area  No fever  No wt loss  Po intake is fair     Past Medical History  She has no past medical history on file.  Dementia , hypothyroid  Surgical History  She has no past surgical history on file.     Social History  She has no history on file for tobacco use, alcohol use, and drug use.    Family History  No family history on file.     Allergies  Patient has no known allergies.    Review of Systems   As mentioned in hpi  Further could not be obtained from pt     Physical Exam   Awake, does not answer questions  No distress  Frail, eldelry   Heent normal mucosa   Neck supple no jvd   Cvs regular   Resp god air entry bel   Abdo soft nontender bs active, no masses  Left inner thigh does appar to be tender, no erythema   Cns awake, does not participate in exam  Able to move ext  Skin intact    Last Recorded Vitals  /86 (Patient Position: Lying)   Pulse 89   Temp 36.7 °C (98.1 °F) (Temporal)   Resp 20   Wt 52.6 kg (116 lb)   SpO2 90%     Relevant Results  Scheduled medications  FLUoxetine, 20 mg, oral, Daily  heparin (porcine), 5,000 Units, subcutaneous, q8h NIHARIKA  levothyroxine, 25 mcg, oral, Daily  melatonin, 3 mg, oral, Nightly  OLANZapine, 2.5 mg, oral, Nightly  pantoprazole, 40 mg, oral, Daily before breakfast   Or  pantoprazole, 40 mg, intravenous, Daily before breakfast  piperacillin-tazobactam, 3.375 g, intravenous, q6h  sodium chloride, 80 mL, intravenous, Once      Continuous medications  lactated Ringer's, 75 mL/hr      PRN medications  PRN medications: acetaminophen **OR** acetaminophen **OR**  acetaminophen, acetaminophen **OR** acetaminophen **OR** acetaminophen, guaiFENesin, ondansetron **OR** ondansetron, sennosides-docusate sodium  Results for orders placed or performed during the hospital encounter of 05/15/24 (from the past 96 hour(s))   POCT GLUCOSE   Result Value Ref Range    POCT Glucose 172 (H) 74 - 99 mg/dL   Comprehensive metabolic panel   Result Value Ref Range    Glucose 133 (H) 74 - 99 mg/dL    Sodium 138 136 - 145 mmol/L    Potassium 4.7 3.5 - 5.3 mmol/L    Chloride 103 98 - 107 mmol/L    Bicarbonate 26 21 - 32 mmol/L    Anion Gap 14 10 - 20 mmol/L    Urea Nitrogen 19 6 - 23 mg/dL    Creatinine 0.81 0.50 - 1.05 mg/dL    eGFR 68 >60 mL/min/1.73m*2    Calcium 8.1 (L) 8.6 - 10.3 mg/dL    Albumin 3.6 3.4 - 5.0 g/dL    Alkaline Phosphatase 71 33 - 136 U/L    Total Protein 7.0 6.4 - 8.2 g/dL    AST 36 9 - 39 U/L    Bilirubin, Total 0.9 0.0 - 1.2 mg/dL    ALT 32 7 - 45 U/L   CBC and Auto Differential   Result Value Ref Range    WBC 13.0 (H) 4.4 - 11.3 x10*3/uL    nRBC 0.0 0.0 - 0.0 /100 WBCs    RBC 4.34 4.00 - 5.20 x10*6/uL    Hemoglobin 12.5 12.0 - 16.0 g/dL    Hematocrit 38.8 36.0 - 46.0 %    MCV 89 80 - 100 fL    MCH 28.8 26.0 - 34.0 pg    MCHC 32.2 32.0 - 36.0 g/dL    RDW 15.1 (H) 11.5 - 14.5 %    Platelets 292 150 - 450 x10*3/uL    Neutrophils % 67.7 40.0 - 80.0 %    Immature Granulocytes %, Automated 0.4 0.0 - 0.9 %    Lymphocytes % 23.7 13.0 - 44.0 %    Monocytes % 6.8 2.0 - 10.0 %    Eosinophils % 0.9 0.0 - 6.0 %    Basophils % 0.5 0.0 - 2.0 %    Neutrophils Absolute 8.81 (H) 1.60 - 5.50 x10*3/uL    Immature Granulocytes Absolute, Automated 0.05 0.00 - 0.50 x10*3/uL    Lymphocytes Absolute 3.09 (H) 0.80 - 3.00 x10*3/uL    Monocytes Absolute 0.89 (H) 0.05 - 0.80 x10*3/uL    Eosinophils Absolute 0.12 0.00 - 0.40 x10*3/uL    Basophils Absolute 0.06 0.00 - 0.10 x10*3/uL   Troponin I, High Sensitivity   Result Value Ref Range    Troponin I, High Sensitivity 15 (H) 0 - 13 ng/L   Lactate    Result Value Ref Range    Lactate 1.6 0.4 - 2.0 mmol/L   Protime-INR   Result Value Ref Range    Protime 10.2 9.8 - 12.8 seconds    INR 0.9 0.9 - 1.1   Troponin I, High Sensitivity   Result Value Ref Range    Troponin I, High Sensitivity 11 0 - 13 ng/L   Urinalysis with Reflex Microscopic   Result Value Ref Range    Color, Urine Orange (N) Light-Yellow, Yellow, Dark-Yellow    Appearance, Urine Ex.Turbid (N) Clear    Specific Gravity, Urine 1.045 (N) 1.005 - 1.035    pH, Urine 6.0 5.0, 5.5, 6.0, 6.5, 7.0, 7.5, 8.0    Protein, Urine 100 (2+) (A) NEGATIVE, 10 (TRACE), 20 (TRACE) mg/dL    Glucose, Urine Normal Normal mg/dL    Blood, Urine 0.2 (2+) (A) NEGATIVE    Ketones, Urine NEGATIVE NEGATIVE mg/dL    Bilirubin, Urine NEGATIVE NEGATIVE    Urobilinogen, Urine Normal Normal mg/dL    Nitrite, Urine 2+ (A) NEGATIVE    Leukocyte Esterase, Urine 500 Gary/µL (A) NEGATIVE   Microscopic Only, Urine   Result Value Ref Range    WBC, Urine >50 (A) 1-5, NONE /HPF    WBC Clumps, Urine MANY Reference range not established. /HPF    RBC, Urine >20 (A) NONE, 1-2, 3-5 /HPF    Bacteria, Urine 1+ (A) NONE SEEN /HPF   Blood Culture    Specimen: Peripheral Venipuncture; Blood culture   Result Value Ref Range    Blood Culture Loaded on Instrument - Culture in progress    Blood Culture    Specimen: Peripheral Venipuncture; Blood culture   Result Value Ref Range    Blood Culture Loaded on Instrument - Culture in progress           Assessment/Plan   Principal Problem:    Altered mental status, unspecified altered mental status type  Active Problems:    Thigh abscess  Advanced dementia  Hypothyroid  Code status dw son dnr dni  Pt has been in hospice before    Plan consult IR for drain  Antibiotics  Resum ehome meds  ID to see  Dc plan home in couple days     Dw son and nursing        Kevon Serrano MD

## 2024-05-16 NOTE — CONSULTS
''Infectious Disease Consult Note''        Referred by Dr Serrano  Reason For Consult: L thigh abscess?       History Of Present Illness:  Patient is a 89-year-old female with history of dementia, hypothyroidism, COPD, hypertension, RA, GERD, admitted on 5/15 with altered mental status.  Her caregiver noticed left thigh swelling couple days ago.  On admission her WBC was 13 K, lactate 1.6, creatinine 0.8.  UA showed pyuria.  CT brain was unremarkable. CT left femur showed loculated rim-enhancing fluid collection within the proximal left abductor musculature.  ID is consulted for antibiotic management.    Current Antibiotic:  Vancomycin  Zosyn      Medications:  No current facility-administered medications on file prior to encounter.     No current outpatient medications on file prior to encounter.     No past medical history on file.  No past surgical history on file.  Social History     Socioeconomic History    Marital status:      Spouse name: Not on file    Number of children: Not on file    Years of education: Not on file    Highest education level: Not on file   Occupational History    Not on file   Tobacco Use    Smoking status: Not on file    Smokeless tobacco: Not on file   Substance and Sexual Activity    Alcohol use: Not on file    Drug use: Not on file    Sexual activity: Not on file   Other Topics Concern    Not on file   Social History Narrative    Not on file     Social Determinants of Health     Financial Resource Strain: Low Risk  (5/16/2024)    Overall Financial Resource Strain (CARDIA)     Difficulty of Paying Living Expenses: Not very hard   Food Insecurity: Unknown (1/16/2023)    Received from [facility]    Hunger Vital Sign     Worried About Running Out of Food in the Last Year: Patient declined     Ran Out of Food in the Last Year: Patient declined   Transportation Needs: No Transportation Needs (5/16/2024)    PRAPARE - Transportation     Lack of  "Transportation (Medical): No     Lack of Transportation (Non-Medical): No   Physical Activity: Inactive (1/16/2023)    Received from [facility]    Exercise Vital Sign     Days of Exercise per Week: 0 days     Minutes of Exercise per Session: 0 min   Stress: Stress Concern Present (1/16/2023)    Received from [facility]    [employer] [place] [employer] - Occupational Stress Questionnaire     Feeling of Stress : Very much   Social Connections: Unknown (1/16/2023)    Received from [facility]    Social Connection and Isolation Panel [NHANES]     Frequency of Communication with Friends and Family: Patient declined     Frequency of Social Gatherings with Friends and Family: Not on file     Attends Caodaism Services: Patient declined     Active Member of Clubs or Organizations: Patient declined     Attends Club or Organization Meetings: Patient declined     Marital Status:    Intimate Partner Violence: Not on file   Housing Stability: Low Risk  (5/16/2024)    Housing Stability Vital Sign     Unable to Pay for Housing in the Last Year: No     Number of Places Lived in the Last Year: 1     Unstable Housing in the Last Year: No     No family history on file.  No Known Allergies      Review of Systems:   Patient is confused and I was not able to take ROS from her.     Physical Exam:  /86 (Patient Position: Lying)   Pulse 89   Temp 36.7 °C (98.1 °F) (Temporal)   Resp 20   Ht 1.575 m (5' 2\")   Wt 52.6 kg (116 lb)   SpO2 90%   BMI 21.22 kg/m²   General: NAD, nontoxic appearing  Skin: no rashes or wounds  Eyes: no scleral icterus  ENT: no oral thrush or lesions  Resp: lungs CTA b/l  CV:  normal S1/S2, no murmur   Abd: soft, non-tender  Back: no CVA tenderness   Ext: Proximal left inner/posterior thigh swelling with ecchymosis , no signs of cellulitis  Neuro: Confused       Lab:  Lab Results   Component Value Date    WBC 15.1 (H) 05/16/2024    HGB 11.3 (L) 05/16/2024    HCT 35.4 (L) " "05/16/2024    MCV 91 05/16/2024     05/16/2024      Results from last 72 hours   Lab Units 05/15/24  1422   SODIUM mmol/L 138   POTASSIUM mmol/L 4.7   CHLORIDE mmol/L 103   CO2 mmol/L 26   BUN mg/dL 19   CREATININE mg/dL 0.81   GLUCOSE mg/dL 133*   CALCIUM mg/dL 8.1*   ANION GAP mmol/L 14   EGFR mL/min/1.73m*2 68     Results from last 72 hours   Lab Units 05/15/24  1422   ALK PHOS U/L 71   BILIRUBIN TOTAL mg/dL 0.9   PROTEIN TOTAL g/dL 7.0   ALT U/L 32   AST U/L 36   ALBUMIN g/dL 3.6     Estimated Creatinine Clearance: 35 mL/min (by C-G formula based on SCr of 0.81 mg/dL).  CRP   Date/Time Value Ref Range Status   12/12/2022 08:27 AM 25.49 (A) mg/dL Final     Comment:     REF VALUE  < 1.00     12/11/2022 06:58 AM 24.61 (A) mg/dL Final     Comment:     REF VALUE  < 1.00       Sedimentation Rate   Date/Time Value Ref Range Status   12/11/2022 06:58 AM 83 (H) 0 - 30 mm/h Final     No results found for: \"HIV1X2\", \"HIVCONF\", \"HYNIMU0KB\"  No results found for: \"HCVPCRQUANT\"      Cultures/Micro:  5/16 blood culture: IP       Imaging: reviewed       Assessment:  Patient is a 89-year-old female with history of dementia, hypothyroidism, COPD, hypertension, RA, GERD, admitted on 5/15 with altered mental status.        -Left thigh fluid collection/likely hematoma  -Leukocytosis  -Pyuria      Plan/Recommendations:  -Continue Zosyn for now  -Follow-up blood and urine culture  -Trend WBC  -Consider LLE duplex    Please call ID with any concerns or questions.   Discussed with family and caregiver.      Natali Han MD  [facility]  #[phone]      "

## 2024-05-17 LAB
ANION GAP SERPL CALC-SCNC: 16 MMOL/L (ref 10–20)
BUN SERPL-MCNC: 14 MG/DL (ref 6–23)
CALCIUM SERPL-MCNC: 8.3 MG/DL (ref 8.6–10.3)
CHLORIDE SERPL-SCNC: 106 MMOL/L (ref 98–107)
CO2 SERPL-SCNC: 23 MMOL/L (ref 21–32)
CREAT SERPL-MCNC: 0.95 MG/DL (ref 0.5–1.05)
EGFRCR SERPLBLD CKD-EPI 2021: 56 ML/MIN/1.73M*2
ERYTHROCYTE [DISTWIDTH] IN BLOOD BY AUTOMATED COUNT: 15.3 % (ref 11.5–14.5)
GLUCOSE SERPL-MCNC: 72 MG/DL (ref 74–99)
HCT VFR BLD AUTO: 34.7 % (ref 36–46)
HGB BLD-MCNC: 11.2 G/DL (ref 12–16)
MCH RBC QN AUTO: 29.1 PG (ref 26–34)
MCHC RBC AUTO-ENTMCNC: 32.3 G/DL (ref 32–36)
MCV RBC AUTO: 90 FL (ref 80–100)
NRBC BLD-RTO: 0 /100 WBCS (ref 0–0)
PLATELET # BLD AUTO: 260 X10*3/UL (ref 150–450)
POTASSIUM SERPL-SCNC: 3.5 MMOL/L (ref 3.5–5.3)
RBC # BLD AUTO: 3.85 X10*6/UL (ref 4–5.2)
SODIUM SERPL-SCNC: 141 MMOL/L (ref 136–145)
WBC # BLD AUTO: 13.4 X10*3/UL (ref 4.4–11.3)

## 2024-05-17 PROCEDURE — 97161 PT EVAL LOW COMPLEX 20 MIN: CPT | Mod: GP

## 2024-05-17 PROCEDURE — 80048 BASIC METABOLIC PNL TOTAL CA: CPT | Performed by: FAMILY MEDICINE

## 2024-05-17 PROCEDURE — 2500000006 HC RX 250 W HCPCS SELF ADMINISTERED DRUGS (ALT 637 FOR ALL PAYERS): Mod: MUE | Performed by: FAMILY MEDICINE

## 2024-05-17 PROCEDURE — 85027 COMPLETE CBC AUTOMATED: CPT | Performed by: FAMILY MEDICINE

## 2024-05-17 PROCEDURE — 2500000004 HC RX 250 GENERAL PHARMACY W/ HCPCS (ALT 636 FOR OP/ED): Performed by: PHARMACIST

## 2024-05-17 PROCEDURE — 2500000001 HC RX 250 WO HCPCS SELF ADMINISTERED DRUGS (ALT 637 FOR MEDICARE OP): Performed by: FAMILY MEDICINE

## 2024-05-17 PROCEDURE — C9113 INJ PANTOPRAZOLE SODIUM, VIA: HCPCS | Performed by: FAMILY MEDICINE

## 2024-05-17 PROCEDURE — 2500000004 HC RX 250 GENERAL PHARMACY W/ HCPCS (ALT 636 FOR OP/ED): Performed by: FAMILY MEDICINE

## 2024-05-17 PROCEDURE — 97165 OT EVAL LOW COMPLEX 30 MIN: CPT | Mod: GO

## 2024-05-17 PROCEDURE — 1100000001 HC PRIVATE ROOM DAILY

## 2024-05-17 RX ORDER — FLUOXETINE HYDROCHLORIDE 20 MG/1
20 CAPSULE ORAL DAILY
COMMUNITY

## 2024-05-17 RX ORDER — SODIUM CHLORIDE, SODIUM LACTATE, POTASSIUM CHLORIDE, CALCIUM CHLORIDE 600; 310; 30; 20 MG/100ML; MG/100ML; MG/100ML; MG/100ML
75 INJECTION, SOLUTION INTRAVENOUS CONTINUOUS
Status: DISCONTINUED | OUTPATIENT
Start: 2024-05-17 | End: 2024-05-18 | Stop reason: HOSPADM

## 2024-05-17 RX ORDER — LEVOTHYROXINE SODIUM 25 UG/1
25 TABLET ORAL DAILY
COMMUNITY

## 2024-05-17 RX ORDER — OLANZAPINE 2.5 MG/1
1 TABLET ORAL NIGHTLY PRN
COMMUNITY

## 2024-05-17 RX ADMIN — Medication 3 MG: at 21:12

## 2024-05-17 RX ADMIN — SODIUM CHLORIDE, POTASSIUM CHLORIDE, SODIUM LACTATE AND CALCIUM CHLORIDE 75 ML/HR: 600; 310; 30; 20 INJECTION, SOLUTION INTRAVENOUS at 10:37

## 2024-05-17 RX ADMIN — PIPERACILLIN SODIUM AND TAZOBACTAM SODIUM 3.38 G: 3; .375 INJECTION, SOLUTION INTRAVENOUS at 03:41

## 2024-05-17 RX ADMIN — PIPERACILLIN SODIUM AND TAZOBACTAM SODIUM 3.38 G: 3; .375 INJECTION, SOLUTION INTRAVENOUS at 21:12

## 2024-05-17 RX ADMIN — PIPERACILLIN SODIUM AND TAZOBACTAM SODIUM 3.38 G: 3; .375 INJECTION, SOLUTION INTRAVENOUS at 15:26

## 2024-05-17 RX ADMIN — PANTOPRAZOLE SODIUM 40 MG: 40 INJECTION, POWDER, FOR SOLUTION INTRAVENOUS at 05:42

## 2024-05-17 RX ADMIN — HEPARIN SODIUM 5000 UNITS: 5000 INJECTION INTRAVENOUS; SUBCUTANEOUS at 21:12

## 2024-05-17 RX ADMIN — FLUOXETINE HYDROCHLORIDE 20 MG: 20 CAPSULE ORAL at 09:02

## 2024-05-17 RX ADMIN — PIPERACILLIN SODIUM AND TAZOBACTAM SODIUM 3.38 G: 3; .375 INJECTION, SOLUTION INTRAVENOUS at 09:02

## 2024-05-17 RX ADMIN — HEPARIN SODIUM 5000 UNITS: 5000 INJECTION INTRAVENOUS; SUBCUTANEOUS at 05:39

## 2024-05-17 RX ADMIN — OLANZAPINE 2.5 MG: 2.5 TABLET, FILM COATED ORAL at 21:12

## 2024-05-17 RX ADMIN — HEPARIN SODIUM 5000 UNITS: 5000 INJECTION INTRAVENOUS; SUBCUTANEOUS at 15:25

## 2024-05-17 RX ADMIN — LEVOTHYROXINE SODIUM 25 MCG: 0.03 TABLET ORAL at 05:39

## 2024-05-17 ASSESSMENT — COGNITIVE AND FUNCTIONAL STATUS - GENERAL
DRESSING REGULAR LOWER BODY CLOTHING: TOTAL
TOILETING: TOTAL
WALKING IN HOSPITAL ROOM: TOTAL
EATING MEALS: TOTAL
DRESSING REGULAR UPPER BODY CLOTHING: TOTAL
PERSONAL GROOMING: TOTAL
DAILY ACTIVITIY SCORE: 6
STANDING UP FROM CHAIR USING ARMS: TOTAL
MOVING FROM LYING ON BACK TO SITTING ON SIDE OF FLAT BED WITH BEDRAILS: TOTAL
HELP NEEDED FOR BATHING: TOTAL
TURNING FROM BACK TO SIDE WHILE IN FLAT BAD: TOTAL
CLIMB 3 TO 5 STEPS WITH RAILING: TOTAL
MOVING TO AND FROM BED TO CHAIR: TOTAL
MOBILITY SCORE: 6

## 2024-05-17 ASSESSMENT — PAIN SCALES - GENERAL: PAINLEVEL_OUTOF10: 0 - NO PAIN

## 2024-05-17 ASSESSMENT — PAIN - FUNCTIONAL ASSESSMENT
PAIN_FUNCTIONAL_ASSESSMENT: UNABLE TO SELF-REPORT
PAIN_FUNCTIONAL_ASSESSMENT: UNABLE TO SELF-REPORT

## 2024-05-17 ASSESSMENT — ACTIVITIES OF DAILY LIVING (ADL): ADL_ASSISTANCE: NEEDS ASSISTANCE

## 2024-05-17 NOTE — PROGRESS NOTES
Pharmacy Medication History Review    Per caregiver bedside     Crystal Wilkins is a 89 y.o. female admitted for Altered mental status, unspecified altered mental status type. Pharmacy reviewed the patient's payca-ly-[id number] medications and allergies for accuracy.    The list below reflectives the updated PTA list. Please review each medication in order reconciliation for additional clarification and justification.       The list below reflectives the updated allergy list. Please review each documented allergy for additional clarification and justification.  Allergies  Reviewed by Kevon Serrano MD on 5/16/2024   No Known Allergies         Below are additional concerns with the patient's PTA list.  Prior to Admission Medications   Prescriptions Last Dose Informant   FLUoxetine (PROzac) 20 mg capsule     Sig: Take 1 capsule (20 mg) by mouth once daily.   OLANZapine (ZyPREXA) 2.5 mg tablet     Sig: Take 1 tablet (2.5 mg) by mouth as needed at bedtime (agitation).   levothyroxine (Synthroid, Levoxyl) 25 mcg tablet     Sig: Take 1 tablet (25 mcg) by mouth early in the morning.. Take on an empty stomach at the same time each day, either 30 to 60 minutes prior to breakfast   multivitamin with minerals iron-free (Centrum Silver)     Sig: Take 1 tablet by mouth once daily.      Facility-Administered Medications: None          Candy Tiwari

## 2024-05-17 NOTE — PROGRESS NOTES
Occupational Therapy                 Therapy Communication Note    Patient Name: Crystal Wilkins  MRN: [id number]  Today's Date: 5/17/2024     Discipline: Occupational Therapy    Missed Visit Reason: Missed Visit Reason: Patient sleeping (Attempted OT eval, caregiver is present and provided PLOF and home living information. Pt is not following any commands, unable to be aroused and participate in OT eval. RN notified and aware.)    Missed Time: Attempt

## 2024-05-17 NOTE — PROGRESS NOTES
05/17/24 1110   Discharge Planning   Patient expects to be discharged to: hospice     Per notes patient is not med ready, patient on IV Zosyn, blood cx pending, per notes family to have meeting on 5/18 with Hospice, I will continue to monitor for discharge planning,

## 2024-05-17 NOTE — PROGRESS NOTES
PALLIATIVE CARE SOCIAL WORK NOTE     Spoke with pt's son Barry this morning. He is interested in meeting with hospice again to see if his mother would again qualify for hospice. He indicated that she had been active with [facility], but stabilized, and then put on the [facility]igator, community palliative care, program. Son says that this was about 8 months ago. His feeling is that she would qualify now. He says that he reached out to [facility] himself yesterday, but is requesting that a referral be sent to them. He would like to meet with them tomorrow, Saturday 5/18. Referral has been placed in [facility] with request that an appointment be scheduled for tomorrow. Thank you.     KRISTY Dodd     Addendum 2:34pm Family to meet with [facility] RN tomorrow morning between 9:30-10:00am.

## 2024-05-17 NOTE — PROGRESS NOTES
Physical Therapy    Physical Therapy Evaluation    Patient Name: Crystal Wilkins  MRN: [id number]  Today's Date: 5/17/2024   Time Calculation  Start Time: 1340  Stop Time: 1348  Time Calculation (min): 8 min    Assessment/Plan   PT Assessment  PT Assessment Results: Decreased strength  Rehab Prognosis: Poor  Barriers to Discharge: Dependency in bility at baseline and within PT evaluation session  Evaluation/Treatment Tolerance: Other (Comment) (DIfficult to assess d/t decreased response/participation from pt)  Medical Staff Made Aware: Yes  Strengths: Support of Caregivers  Barriers to Participation: Comorbidities, Ability to acquire knowledge  End of Session Communication: Bedside nurse  Assessment Comment: Pt presents today with dependency in mobility. Pt is dependent in I/ADLs at baseline. PT also unable to participate in therapy session today. Pt does not demonstrate any further skilled acute PT needs at this time. WIll plan to D/C PT orders.  End of Session Patient Position: Bed, 3 rail up, Alarm on  IP OR SWING BED PT PLAN  Inpatient or Swing Bed: Inpatient  PT Plan  PT Plan: PT Eval only  PT Eval Only Reason: No acute PT needs identified  PT Frequency: PT eval only  PT Discharge Recommendations: No further acute PT  PT Recommended Transfer Status: Assist x2, Total assist  PT - OK to Discharge: Yes (Per PT POC)    Subjective   General Visit Information:  General  Reason for Referral: 89 y/o F presenting with AMS and L thigh swelling  Referred By: GISELE Serrano  Past Medical History Relevant to Rehab: Dementia, hypothyroidism, RA, depression, HTN, GERD, COPD, HLD    Missed Visit: Yes  Missed Visit Reason: Patient sleeping  Family/Caregiver Present: No  Co-Treatment: OT  Co-Treatment Reason: Co-evaluation with OT to maximize pt safety and participation.  Prior to Session Communication: Bedside nurse  Patient Position Received: Bed, 3 rail up, Alarm on  General Comment: Pt supine in bed and difficulty to arouse.  Cleared to participate with RN.  Home Living:  Home Living  Type of Home: House  Lives With: Other (Comment) (Caregiver present 24/7)  Home Adaptive Equipment: Wheelchair-manual  Home Layout: Able to live on main level with bedroom/bathroom  Home Access: Ramped entrance  Bathroom Shower/Tub: Walk-in shower  Bathroom Toilet: Standard  Bathroom Equipment: Grab bars in shower, Grab bars around toilet (shower chair)  Bathroom Accessibility: Pt has been sponge-bathing with assist for last 2 months  Prior Level of Function:  Prior Function Per Pt/Caregiver Report  Level of [place]: Needs assistance with ADLs, Needs assistance with homemaking, Needs assistance with functional transfers  Receives Help From: Primary caregiver (Caregiver present 24 hrs/day)  Prior Function Comments: Pt unable to provide PLOF information. Information obtained from caregiver. Per caregiver, pt dependent in ADLs for the last 2 years and has been dependent in IADLs. Pt able to pivot transfer to  with walker and mod assist 2-3 months ago.  Precautions:  Precautions  LE Weight Bearing Status: Left Non-Weight Bearing  Medical Precautions: Fall precautions    Objective   Pain:  Pain Assessment  Pain Assessment: Unable to self-report  Cognition:  Cognition  Orientation Level: Unable to assess    General Assessments:  Activity Tolerance  Endurance: Other (Comment) (Unabel to assess endurance d/t decreased participation)    Coordination  Coordination Comment: Unable to assess    Postural Control  Postural Control: Impaired  Posture Comment: Forward head and roundd shoulders in EOB sitting    Static Sitting Balance  Static Sitting-Level of Assistance: Dependent    Static Standing Balance  Static Standing-Comment/Number of Minutes: Unable to assess standing balance d/t decreased commmand following and participation.  Dynamic Standing Balance  Dynamic Standing-Comments: See static standing balance comments  Functional Assessments:  Bed Mobility  Bed  Mobility: Yes  Bed Mobility 1  Bed Mobility 1: Supine to sitting, Sitting to supine  Level of Assistance 1: Dependent, +2  Bed Mobility Comments 1: With HOB elevated via chux pad    Transfers  Transfer: No (See static standing balance comments)    Ambulation/Gait Training  Ambulation/Gait Training Performed:  (See static standing comments)    Stairs  Stairs: No  Extremity/Trunk Assessments:  RLE   RLE : Exceptions to WFL (ROM appears intact grossly)  Strength RLE  RLE Overall Strength: Unable to assess  LLE   LLE : Exceptions to WFL (ROM appears intact grossly)  Strength LLE  LLE Overall Strength: Unable to assess  Outcome Measures:  [facility] Basic Mobility  Turning from your back to your side while in a flat bed without using bedrails: Total  Moving from lying on your back to sitting on the side of a flat bed without using bedrails: Total  Moving to and from bed to chair (including a wheelchair): Total  Standing up from a chair using your arms (e.g. wheelchair or bedside chair): Total  To walk in hospital room: Total  Climbing 3-5 steps with railing: Total  Basic Mobility - Total Score: 6    Education Documentation  Mobility Training, taught by Aydin Stein, PT at 5/17/2024  2:09 PM.  Learner: Patient  Readiness: Acceptance  Method: Explanation, Demonstration  Response: No Evidence of Learning    Education Comments  No comments found.

## 2024-05-17 NOTE — PROGRESS NOTES
"                                         '' Infectious Disease Progress Note''        Interval Events:  No new symptoms  Afebrile  Blood cultures no growth to date      Current antibiotic:  Zosyn    Physical Exam:  /70 (BP Location: Left arm, Patient Position: Lying)   Pulse 83   Temp 37 °C (98.6 °F) (Axillary)   Resp 18   Ht 1.575 m (5' 2\")   Wt 52.6 kg (116 lb)   SpO2 93%   BMI 21.22 kg/m²   General: NAD, nontoxic appearing  Skin: no new rash  Resp: lungs CTA b/l  CV:  normal S1/S2, no murmur   Abd: soft, non-tender  Ext:  Proximal left inner/posterior thigh swelling with ecchymosis , no signs of cellulitis       Lab Results:  Reviewed    Micro:  5/16 blood culture: NGTD       Imaging: reviewed         Assessment:  Patient is a 89-year-old female with history of dementia, hypothyroidism, COPD, hypertension, RA, GERD, admitted on 5/15 with altered mental status.       -Left thigh fluid collection/likely hematoma  -Leukocytosis: trending down  -Pyuria        Plan/Recommendations:  -Continue Zosyn (switch to Augmentin to complete a 7-day course of antibiotics when she is ready for discharge)  -Follow-up blood and urine culture  -Trend WBC  -Monitor for adverse effects of antibiotics including diarrhea and rash.      Please call ID with any concerns or questions.  Discussed with primary team and RN.    Natali Han MD  [facility]  #[phone]      "

## 2024-05-17 NOTE — PROGRESS NOTES
Occupational Therapy    Evaluation    Patient Name: Crystal Wilkins  MRN: [id number]  Today's Date: 5/17/2024  Time Calculation  Start Time: 1339  Stop Time: 1348  Time Calculation (min): 9 min        Assessment:  OT Assessment: Pt presents with deconditioning, inability to follow therapist commands, decrease cognition, strength, endurance and balance, impeding ADL performance and functional mobility. Pt currently dependent in all ADLs and functional mobility. Family has meeting with hospital as potential d/c. No acute OT needs identified at this time and will discontinue orders.  Prognosis: Poor  Evaluation/Treatment Tolerance: Patient limited by fatigue  Medical Staff Made Aware: Yes  End of Session Communication: Bedside nurse  End of Session Patient Position: Bed, 3 rail up, Alarm on  OT Assessment Results: Decreased ADL status, Decreased upper extremity range of motion, Decreased upper extremity strength, Decreased safe judgment during ADL, Decreased cognition, Decreased endurance, Decreased fine motor control, Decreased functional mobility, Decreased IADLs, Decreased trunk control for functional activities  Prognosis: Poor  Evaluation/Treatment Tolerance: Patient limited by fatigue  Medical Staff Made Aware: Yes  Strengths: Living arrangement secure, Support of Caregivers  Barriers to Participation: Ability to acquire knowledge, Insight into problems  Plan:  No Skilled OT: No acute OT goals identified  OT Frequency: OT eval only  OT Discharge Recommendations: No further acute OT, No OT needed after discharge  OT Recommended Transfer Status: Dependent  OT - OK to Discharge: Yes (Per POC)       Subjective        General:  General  Reason for Referral: 89 y.o. female presenting with AMS, deconditioning and decline in ADL performance and functional mobility.  Referred By: Kevon Serrano MD  Past Medical History Relevant to Rehab:   Missed Visit: Yes  Missed Visit Reason: Patient sleeping (Attempted OT eval,  caregiver is present and provided PLOF and home living information. Pt is not following any commands, unable to be aroused and participate in OT eval. RN notified and aware.)  Family/Caregiver Present: No  Co-Treatment: PT  Co-Treatment Reason: To maximize pt safety, mobility and performance.  Prior to Session Communication: Bedside nurse  Patient Position Received: Bed, 3 rail up, Alarm on  General Comment: Pt sleeping upon arrival, difficult to arouse with little to no minimal responses to stimuli. Verbal and tactile cues provided throughout to increase alertness and participation.  Precautions: Fall Precautions     Pain:  Pain Assessment  Pain Assessment: Unable to self-report    Objective   Cognition:  Overall Cognitive Status: Impaired at baseline  Orientation Level: Unable to assess  Following Commands: Other (Comment) (unable to follow commands)  Attention: Exceptions to WFL  Memory: Exceptions to WFL  Problem Solving: Exceptions to WFL  Safety/Judgement: Exceptions to WFL           Home Living:  Type of Home: House  Lives With:  (Caregiver 24/7)  Home Adaptive Equipment: Walker rolling or standard, Wheelchair-manual  Home Layout: One level  Home Access: Ramped entrance (2 steps with both handrails)  Bathroom Shower/Tub: Walk-in shower  Bathroom Toilet: Standard  Bathroom Equipment: Grab bars in shower, Shower chair with back, Grab bars around toilet  Prior Function:  Level of [place]: Needs assistance with ADLs, Needs assistance with homemaking, Needs assistance with functional transfers  Receives Help From: Personal care attendant  ADL Assistance: Needs assistance  Homemaking Assistance: Needs assistance  Ambulatory Assistance: Needs assistance  Prior Function Comments: Caregiver reported around 2 years ago, pt required assist with ADLs and iADLs as well as x1 assist with walker. Around 6 months ago, began needing max to total assist for feeding and has not ambulated for about 3 months.  Activity  Tolerance:  Endurance: Decreased tolerance for upright activites, Tolerates less than 10 min exercise, no significant change in vital signs  Bed Mobility/Transfers: Bed Mobility  Bed Mobility: Yes  Bed Mobility 1  Bed Mobility 1: Supine to sitting, Sitting to supine  Level of Assistance 1: Dependent, +2  Bed Mobility Comments 1: Assist with BLE and trunk toward EOB. Pt continue to demo decrease arosual, unable to follow commands with little to no repsonses to stimuli.    Transfers  Transfer: No    Sitting Balance:  Static Sitting Balance  Static Sitting-Balance Support: Feet supported  Static Sitting-Level of Assistance: Dependent  Static Sitting-Comment/Number of Minutes: EOB      Coordination:  Coordination Comment: Tremors noted   Hand Function:  Gross Grasp: Impaired (2-/5)  Coordination: Impaired  Extremities: RUE   RUE : Exceptions to WFL (Unable to assess) and LUE   LUE: Exceptions to WFL (Unable to assess)    Outcome Measures:[facility] Daily Activity  Putting on and taking off regular lower body clothing: Total  Bathing (including washing, rinsing, drying): Total  Putting on and taking off regular upper body clothing: Total  Toileting, which includes using toilet, bedpan or urinal: Total  Taking care of personal grooming such as brushing teeth: Total  Eating Meals: Total  Daily Activity - Total Score: 6        Education Documentation  Body Mechanics, taught by Helen Baeza OT at 5/17/2024  2:06 PM.  Learner: Patient  Readiness: Acceptance  Method: Explanation, Demonstration  Response: No Evidence of Learning    ADL Training, taught by Helen Baeza OT at 5/17/2024  2:06 PM.  Learner: Patient  Readiness: Acceptance  Method: Explanation, Demonstration  Response: No Evidence of Learning    Education Comments  No comments found.        OP EDUCATION:

## 2024-05-17 NOTE — PROGRESS NOTES
Physical Therapy                 Therapy Communication Note    Patient Name: Crystal Wilkins  MRN: [id number]  Today's Date: 5/17/2024     Discipline: Physical Therapy    Missed Visit Reason: Missed Visit Reason: Patient sleeping    Missed Time: Attempt    Comment: Pt cleared for participation with PT/OT. Caregiver present and provides PLOF for pt. Pt sleeping and unable to be aroused at this time to participate. RN notified.

## 2024-05-18 VITALS
SYSTOLIC BLOOD PRESSURE: 177 MMHG | DIASTOLIC BLOOD PRESSURE: 70 MMHG | HEART RATE: 72 BPM | RESPIRATION RATE: 16 BRPM | HEIGHT: 62 IN | BODY MASS INDEX: 21.35 KG/M2 | TEMPERATURE: 98.1 F | WEIGHT: 116 LBS | OXYGEN SATURATION: 96 %

## 2024-05-18 LAB
ANION GAP SERPL CALC-SCNC: 11 MMOL/L (ref 10–20)
BUN SERPL-MCNC: 11 MG/DL (ref 6–23)
CALCIUM SERPL-MCNC: 7.8 MG/DL (ref 8.6–10.3)
CHLORIDE SERPL-SCNC: 106 MMOL/L (ref 98–107)
CO2 SERPL-SCNC: 26 MMOL/L (ref 21–32)
CREAT SERPL-MCNC: 0.98 MG/DL (ref 0.5–1.05)
EGFRCR SERPLBLD CKD-EPI 2021: 54 ML/MIN/1.73M*2
ERYTHROCYTE [DISTWIDTH] IN BLOOD BY AUTOMATED COUNT: 15.4 % (ref 11.5–14.5)
GLUCOSE SERPL-MCNC: 93 MG/DL (ref 74–99)
HCT VFR BLD AUTO: 33 % (ref 36–46)
HGB BLD-MCNC: 10.8 G/DL (ref 12–16)
MCH RBC QN AUTO: 29.1 PG (ref 26–34)
MCHC RBC AUTO-ENTMCNC: 32.7 G/DL (ref 32–36)
MCV RBC AUTO: 89 FL (ref 80–100)
NRBC BLD-RTO: 0 /100 WBCS (ref 0–0)
PLATELET # BLD AUTO: 243 X10*3/UL (ref 150–450)
POTASSIUM SERPL-SCNC: 3.4 MMOL/L (ref 3.5–5.3)
RBC # BLD AUTO: 3.71 X10*6/UL (ref 4–5.2)
SODIUM SERPL-SCNC: 140 MMOL/L (ref 136–145)
WBC # BLD AUTO: 9.6 X10*3/UL (ref 4.4–11.3)

## 2024-05-18 PROCEDURE — C9113 INJ PANTOPRAZOLE SODIUM, VIA: HCPCS | Performed by: FAMILY MEDICINE

## 2024-05-18 PROCEDURE — 85027 COMPLETE CBC AUTOMATED: CPT | Performed by: FAMILY MEDICINE

## 2024-05-18 PROCEDURE — 2500000004 HC RX 250 GENERAL PHARMACY W/ HCPCS (ALT 636 FOR OP/ED): Performed by: PHARMACIST

## 2024-05-18 PROCEDURE — 2500000004 HC RX 250 GENERAL PHARMACY W/ HCPCS (ALT 636 FOR OP/ED): Performed by: FAMILY MEDICINE

## 2024-05-18 PROCEDURE — 2500000001 HC RX 250 WO HCPCS SELF ADMINISTERED DRUGS (ALT 637 FOR MEDICARE OP): Performed by: FAMILY MEDICINE

## 2024-05-18 PROCEDURE — 80048 BASIC METABOLIC PNL TOTAL CA: CPT | Performed by: FAMILY MEDICINE

## 2024-05-18 PROCEDURE — 36415 COLL VENOUS BLD VENIPUNCTURE: CPT | Performed by: FAMILY MEDICINE

## 2024-05-18 RX ORDER — POTASSIUM CHLORIDE 1.5 G/1.58G
20 POWDER, FOR SOLUTION ORAL ONCE
Status: COMPLETED | OUTPATIENT
Start: 2024-05-18 | End: 2024-05-18

## 2024-05-18 RX ORDER — AMOXICILLIN AND CLAVULANATE POTASSIUM 600; 42.9 MG/5ML; MG/5ML
600 POWDER, FOR SUSPENSION ORAL EVERY 12 HOURS SCHEDULED
Qty: 20 ML | Refills: 0 | Status: SHIPPED | OUTPATIENT
Start: 2024-05-18 | End: 2024-05-20

## 2024-05-18 RX ADMIN — FLUOXETINE HYDROCHLORIDE 20 MG: 20 CAPSULE ORAL at 09:13

## 2024-05-18 RX ADMIN — LEVOTHYROXINE SODIUM 25 MCG: 0.03 TABLET ORAL at 05:44

## 2024-05-18 RX ADMIN — POTASSIUM CHLORIDE 20 MEQ: 1.5 POWDER, FOR SOLUTION ORAL at 11:55

## 2024-05-18 RX ADMIN — HEPARIN SODIUM 5000 UNITS: 5000 INJECTION INTRAVENOUS; SUBCUTANEOUS at 05:44

## 2024-05-18 RX ADMIN — PIPERACILLIN SODIUM AND TAZOBACTAM SODIUM 3.38 G: 3; .375 INJECTION, SOLUTION INTRAVENOUS at 03:08

## 2024-05-18 RX ADMIN — PIPERACILLIN SODIUM AND TAZOBACTAM SODIUM 3.38 G: 3; .375 INJECTION, SOLUTION INTRAVENOUS at 09:14

## 2024-05-18 RX ADMIN — PANTOPRAZOLE SODIUM 40 MG: 40 INJECTION, POWDER, FOR SOLUTION INTRAVENOUS at 09:14

## 2024-05-18 RX ADMIN — SODIUM CHLORIDE, POTASSIUM CHLORIDE, SODIUM LACTATE AND CALCIUM CHLORIDE 75 ML/HR: 600; 310; 30; 20 INJECTION, SOLUTION INTRAVENOUS at 03:08

## 2024-05-18 ASSESSMENT — COGNITIVE AND FUNCTIONAL STATUS - GENERAL
DAILY ACTIVITIY SCORE: 6
DRESSING REGULAR UPPER BODY CLOTHING: TOTAL
DAILY ACTIVITIY SCORE: 6
WALKING IN HOSPITAL ROOM: TOTAL
EATING MEALS: TOTAL
MOBILITY SCORE: 6
TURNING FROM BACK TO SIDE WHILE IN FLAT BAD: TOTAL
DRESSING REGULAR LOWER BODY CLOTHING: TOTAL
HELP NEEDED FOR BATHING: TOTAL
WALKING IN HOSPITAL ROOM: TOTAL
DRESSING REGULAR UPPER BODY CLOTHING: TOTAL
STANDING UP FROM CHAIR USING ARMS: TOTAL
PERSONAL GROOMING: TOTAL
DRESSING REGULAR LOWER BODY CLOTHING: TOTAL
TURNING FROM BACK TO SIDE WHILE IN FLAT BAD: TOTAL
TOILETING: TOTAL
MOVING TO AND FROM BED TO CHAIR: TOTAL
TOILETING: TOTAL
HELP NEEDED FOR BATHING: TOTAL
MOVING FROM LYING ON BACK TO SITTING ON SIDE OF FLAT BED WITH BEDRAILS: TOTAL
MOVING FROM LYING ON BACK TO SITTING ON SIDE OF FLAT BED WITH BEDRAILS: TOTAL
STANDING UP FROM CHAIR USING ARMS: TOTAL
EATING MEALS: TOTAL
PERSONAL GROOMING: TOTAL
MOVING TO AND FROM BED TO CHAIR: TOTAL
CLIMB 3 TO 5 STEPS WITH RAILING: TOTAL
MOBILITY SCORE: 6
CLIMB 3 TO 5 STEPS WITH RAILING: TOTAL

## 2024-05-18 ASSESSMENT — PAIN SCALES - PAIN ASSESSMENT IN ADVANCED DEMENTIA (PAINAD)
BODYLANGUAGE: RELAXED
FACIALEXPRESSION: SMILING OR INEXPRESSIVE
TOTALSCORE: 0
TOTALSCORE: 0
BREATHING: NORMAL
CONSOLABILITY: NO NEED TO CONSOLE
BREATHING: NORMAL
CONSOLABILITY: NO NEED TO CONSOLE
FACIALEXPRESSION: SMILING OR INEXPRESSIVE
BODYLANGUAGE: RELAXED

## 2024-05-18 ASSESSMENT — PAIN - FUNCTIONAL ASSESSMENT
PAIN_FUNCTIONAL_ASSESSMENT: PAINAD (PAIN ASSESSMENT IN ADVANCED DEMENTIA SCALE)
PAIN_FUNCTIONAL_ASSESSMENT: PAINAD (PAIN ASSESSMENT IN ADVANCED DEMENTIA SCALE)

## 2024-05-18 NOTE — PROGRESS NOTES
"                                         '' Infectious Disease Progress Note''        Interval Events:  No new symptoms  Afebrile  Blood cultures no growth to date      Current antibiotic:  Zosyn    Physical Exam:  /70 (BP Location: Right arm, Patient Position: Lying)   Pulse 72   Temp 36.7 °C (98.1 °F) (Axillary)   Resp 16   Ht 1.575 m (5' 2\")   Wt 52.6 kg (116 lb)   SpO2 96%   BMI 21.22 kg/m²   General: NAD, nontoxic appearing  Skin: no new rash  Resp: lungs CTA b/l  CV:  normal S1/S2, no murmur   Abd: soft, non-tender  Ext:  Proximal left inner/posterior thigh swelling with ecchymosis , no signs of cellulitis       Lab Results:  Reviewed    Micro:  5/16 blood culture: NGTD       Imaging: reviewed         Assessment:  Patient is a 89-year-old female with history of dementia, hypothyroidism, COPD, hypertension, RA, GERD, admitted on 5/15 with altered mental status.       -Left thigh fluid collection/likely hematoma  -Leukocytosis: resolved   -Pyuria        Plan/Recommendations:  -Continue Zosyn (switch to Augmentin to complete a 7-day course of antibiotics when she is ready for discharge)  -Monitor for adverse effects of antibiotics including diarrhea and rash.      Please call ID with any concerns or questions.      Natali Han MD  [facility], [place]  #[phone]      "

## 2024-05-18 NOTE — PROGRESS NOTES
Crystal Wilkins is a 89 y.o. female on day 2 of admission presenting with Altered mental status, unspecified altered mental status type.      Subjective   Pt seen dw nursing   She is unable to provide any hx   And is not eatign this morning  Her family is by bedside       Objective     Last Recorded Vitals  /70 (BP Location: Right arm, Patient Position: Lying)   Pulse 72   Temp 36.7 °C (98.1 °F) (Axillary)   Resp 16   Wt 52.6 kg (116 lb)   SpO2 96%   Intake/Output last 3 Shifts:    Intake/Output Summary (Last 24 hours) at 5/18/2024 1215  Last data filed at 5/18/2024 1036  Gross per 24 hour   Intake 1898.75 ml   Output 800 ml   Net 1098.75 ml       Admission Weight  Weight: 52.2 kg (115 lb) (05/15/24 1353)    Daily Weight  05/15/24 : 52.6 kg (116 lb)    Image Results  CT femur right w IV contrast, CT femur left w IV contrast  Narrative: Interpreted By:  Kishan Farfan,   STUDY:  CT FEMUR LEFT W IV CONTRAST; CT FEMUR RIGHT W IV CONTRAST;  5/15/2024  7:11 pm; 5/15/2024 5:21 pm      INDICATION:  Signs/Symptoms:swelling; Signs/Symptoms:swelling medial thigh.      COMPARISON:  None.      ACCESSION NUMBER(S):  [id number]; [id number]      ORDERING CLINICIAN:  ROBERT CAMPOVERDE      TECHNIQUE:  CT imaging of the bilateral thigh/femur was obtained after the  intravenous administration of iodinated contrast. Coronal and  sagittal reformatted images were performed. 3D reconstructed images  were not performed at time of dictation therefore not used during  interpretation.      FINDINGS:  LEFT:  There is a loculated rim enhancing fluid collection within the left  adductor musculature measuring ~ 15.3 cm x 5.5 cm x 3.5 cm. There  are a few internal foci of calcification within the collection. The  collection involves predominantly the pectineus and adductor brevis  muscles and also tracks into the adductor longus muscle. There is no  internal gas. There is diffuse subcutaneous stranding  of the medial  left thigh. There is more generalized subcutaneous edema of the left  buttock and proximal thigh. There is a chronic full-thickness left  hamstring tendon tear predominantly involving the semimembranosus  with advanced fatty atrophy. There is no acute fracture of the left  femur. There is no malalignment left hip. There is mild left hip  osteoarthrosis with small joint effusion. There is moderate left knee  osteoarthrosis with a small joint effusion and loose bodies. There is  dense calcific atherosclerosis of the left common and superficial  femoral arteries. The left common femoral vein, femoral vein,  popliteal vein appear patent.              RIGHT:  There is a 5.5 x 2.5 x 4.4 cm lipoma in the right gluteus neha  muscle. There is advanced right hamstring tendinosis and  calcification. There is no soft tissue gas or foreign body within the  right lower extremity. There is no acute hemorrhage or abscess in the  right thigh. There is advanced fatty atrophy of the semimembranosus  and biceps femoris tendon. There is mild right hip osteoarthrosis  without a significant joint effusion. There is no acute fracture or  malalignment of the right proximal femur. There is moderate  osteoarthrosis of the right knee without significant joint effusion.  There is thick calcific atherosclerosis of the right common and  superficial femoral arteries. The visualized portions of the right  common femoral vein, femoral vein, and popliteal vein are patent.          OTHER:  There is a large amount of impacted stool in rectum distended up to  8.5 x 6.9 cm. The uterus is surgically absent.      Impression: LEFT LOWER EXTREMITY:  1. Loculated rim enhancing fluid collection within the proximal left  adductor musculature measuring 15.3 cm x 5.5 cm x 3.5 cm with  locoregional edema/fat stranding in the overlying fat. Differential  diagnosis includes abscess with adjacent cellulitis or  sterile/infected subacute hematoma  status post high-grade muscle  injury. Fluid sampling recommended.  2. Small left hip and left knee joint effusions without evidence of  osseous destruction or erosive change. These are accompanied by mild  hip and moderate knee osteoarthrosis.  3. No acute fracture of the left femur.      RIGHT LOWER EXTREMITY:  1. No acute osseous abnormality of the right femur.  2. Mild right hip osteoarthrosis and moderate right knee  osteoarthrosis without joint effusions.  3. No acute soft tissue abnormality of the right thigh.          Other:  Large amount of impacted stool in the rectum, distended up to 8.5 x  6.9 cm.      MACRO:  None.      Signed by: Kishan Farfan 5/15/2024 8:01 PM  Dictation workstation:   [id number]  CT brain attack head wo IV contrast  Narrative: Interpreted By:  Con Harris,   STUDY:  CT BRAIN ATTACK HEAD WO IV CONTRAST;  5/15/2024 2:12 pm      INDICATION:  Signs/Symptoms:stroke.      COMPARISON:  CT Brain, 27 September 2023      ACCESSION NUMBER(S):  [id number]      ORDERING CLINICIAN:  DIEGO CAMPOVERDE      TECHNIQUE:  CT of the brain from the skull vertex to the skull base, without  intravenous contrast      FINDINGS:  ACUTE INTRA-AXIAL HEMORRHAGE:  Negative      ACUTE EXTRA-AXIAL/SUBDURAL HEMORRHAGE:  Negative      ACUTE INTRACRANIAL MASS EFFECT:  Negative      CT EVIDENCE OF ACUTE / SUBACUTE TERRITORIAL ISCHEMIA:  Negative      VENTRICLES:  Diffusely prominent for size but in proportion to the  degree of atrophy; no acute pathologic findings      OTHER BRAIN FINDINGS:  No significant interval change to the CT  appearance of the brain including the degree of atrophy and deep  white matter changes from chronic microvascular disease      INCLUDED PARANASAL SINUSES: All clear      INCLUDED MASTOID AIR CELLS: All clear      SKULL:  No lytic or blastic lesion      EXTRACRANIAL SOFT TISSUES:  Scalp and occular globes grossly normal  by CT      Impression: NEGATIVE BRAIN ATTACK PROTOCOL  CT BRAIN:      NO ACUTE INTRACRANIAL HEMORRHAGE      NO ACUTE INTRACRANIAL MASS EFFECT      NO CT EVIDENCE OF A LARGE ACUTE TERRITORIAL INFARCT      I WAS ABLE TO COMMUNICATE THESE FINDINGS BY TELEPHONE TO DIEGO JACOBSEN AT 14 18 HOURS, SAME DAY, 15 MAY 2024      MACRO:  Con Kimberly discussed the significance and urgency of this critical  finding by telephone with  DIEGO CAMPOVERDE on 5/15/2024  at 2:18 pm.  (**-RCF-**) Findings:  See findings.      Signed by: Con Harris 5/15/2024 2:18 PM  Dictation workstation:   [id number]      Physical Exam  Awake, does not follow any commands       Relevant Results  Scheduled medications  FLUoxetine, 20 mg, oral, Daily  heparin (porcine), 5,000 Units, subcutaneous, q8h NIHARIKA  levothyroxine, 25 mcg, oral, Daily  melatonin, 3 mg, oral, Nightly  OLANZapine, 2.5 mg, oral, Nightly  pantoprazole, 40 mg, oral, Daily before breakfast   Or  pantoprazole, 40 mg, intravenous, Daily before breakfast  piperacillin-tazobactam, 3.375 g, intravenous, q6h      Continuous medications  lactated Ringer's, 75 mL/hr, Last Rate: 75 mL/hr (05/18/24 1036)      PRN medications  PRN medications: acetaminophen **OR** acetaminophen **OR** acetaminophen, acetaminophen **OR** acetaminophen **OR** acetaminophen, guaiFENesin, ondansetron **OR** ondansetron, sennosides-docusate sodium  Results for orders placed or performed during the hospital encounter of 05/15/24 (from the past 96 hour(s))   POCT GLUCOSE   Result Value Ref Range    POCT Glucose 172 (H) 74 - 99 mg/dL   Comprehensive metabolic panel   Result Value Ref Range    Glucose 133 (H) 74 - 99 mg/dL    Sodium 138 136 - 145 mmol/L    Potassium 4.7 3.5 - 5.3 mmol/L    Chloride 103 98 - 107 mmol/L    Bicarbonate 26 21 - 32 mmol/L    Anion Gap 14 10 - 20 mmol/L    Urea Nitrogen 19 6 - 23 mg/dL    Creatinine 0.81 0.50 - 1.05 mg/dL    eGFR 68 >60 mL/min/1.73m*2    Calcium 8.1 (L) 8.6 - 10.3 mg/dL    Albumin 3.6 3.4 - 5.0 g/dL     Alkaline Phosphatase 71 33 - 136 U/L    Total Protein 7.0 6.4 - 8.2 g/dL    AST 36 9 - 39 U/L    Bilirubin, Total 0.9 0.0 - 1.2 mg/dL    ALT 32 7 - 45 U/L   CBC and Auto Differential   Result Value Ref Range    WBC 13.0 (H) 4.4 - 11.3 x10*3/uL    nRBC 0.0 0.0 - 0.0 /100 WBCs    RBC 4.34 4.00 - 5.20 x10*6/uL    Hemoglobin 12.5 12.0 - 16.0 g/dL    Hematocrit 38.8 36.0 - 46.0 %    MCV 89 80 - 100 fL    MCH 28.8 26.0 - 34.0 pg    MCHC 32.2 32.0 - 36.0 g/dL    RDW 15.1 (H) 11.5 - 14.5 %    Platelets 292 150 - 450 x10*3/uL    Neutrophils % 67.7 40.0 - 80.0 %    Immature Granulocytes %, Automated 0.4 0.0 - 0.9 %    Lymphocytes % 23.7 13.0 - 44.0 %    Monocytes % 6.8 2.0 - 10.0 %    Eosinophils % 0.9 0.0 - 6.0 %    Basophils % 0.5 0.0 - 2.0 %    Neutrophils Absolute 8.81 (H) 1.60 - 5.50 x10*3/uL    Immature Granulocytes Absolute, Automated 0.05 0.00 - 0.50 x10*3/uL    Lymphocytes Absolute 3.09 (H) 0.80 - 3.00 x10*3/uL    Monocytes Absolute 0.89 (H) 0.05 - 0.80 x10*3/uL    Eosinophils Absolute 0.12 0.00 - 0.40 x10*3/uL    Basophils Absolute 0.06 0.00 - 0.10 x10*3/uL   Troponin I, High Sensitivity   Result Value Ref Range    Troponin I, High Sensitivity 15 (H) 0 - 13 ng/L   Lactate   Result Value Ref Range    Lactate 1.6 0.4 - 2.0 mmol/L   Protime-INR   Result Value Ref Range    Protime 10.2 9.8 - 12.8 seconds    INR 0.9 0.9 - 1.1   Troponin I, High Sensitivity   Result Value Ref Range    Troponin I, High Sensitivity 11 0 - 13 ng/L   Urinalysis with Reflex Microscopic   Result Value Ref Range    Color, Urine Orange (N) Light-Yellow, Yellow, Dark-Yellow    Appearance, Urine Ex.Turbid (N) Clear    Specific Gravity, Urine 1.045 (N) 1.005 - 1.035    pH, Urine 6.0 5.0, 5.5, 6.0, 6.5, 7.0, 7.5, 8.0    Protein, Urine 100 (2+) (A) NEGATIVE, 10 (TRACE), 20 (TRACE) mg/dL    Glucose, Urine Normal Normal mg/dL    Blood, Urine 0.2 (2+) (A) NEGATIVE    Ketones, Urine NEGATIVE NEGATIVE mg/dL    Bilirubin, Urine NEGATIVE NEGATIVE     Urobilinogen, Urine Normal Normal mg/dL    Nitrite, Urine 2+ (A) NEGATIVE    Leukocyte Esterase, Urine 500 Gary/µL (A) NEGATIVE   Microscopic Only, Urine   Result Value Ref Range    WBC, Urine >50 (A) 1-5, NONE /HPF    WBC Clumps, Urine MANY Reference range not established. /HPF    RBC, Urine >20 (A) NONE, 1-2, 3-5 /HPF    Bacteria, Urine 1+ (A) NONE SEEN /HPF   Blood Culture    Specimen: Peripheral Venipuncture; Blood culture   Result Value Ref Range    Blood Culture No growth at 2 days    Blood Culture    Specimen: Peripheral Venipuncture; Blood culture   Result Value Ref Range    Blood Culture No growth at 2 days    CBC   Result Value Ref Range    WBC 15.1 (H) 4.4 - 11.3 x10*3/uL    nRBC 0.7 (H) 0.0 - 0.0 /100 WBCs    RBC 3.91 (L) 4.00 - 5.20 x10*6/uL    Hemoglobin 11.3 (L) 12.0 - 16.0 g/dL    Hematocrit 35.4 (L) 36.0 - 46.0 %    MCV 91 80 - 100 fL    MCH 28.9 26.0 - 34.0 pg    MCHC 31.9 (L) 32.0 - 36.0 g/dL    RDW 15.2 (H) 11.5 - 14.5 %    Platelets 247 150 - 450 x10*3/uL   Basic metabolic panel   Result Value Ref Range    Glucose 94 74 - 99 mg/dL    Sodium 138 136 - 145 mmol/L    Potassium 4.2 3.5 - 5.3 mmol/L    Chloride 106 98 - 107 mmol/L    Bicarbonate 23 21 - 32 mmol/L    Anion Gap 13 10 - 20 mmol/L    Urea Nitrogen 16 6 - 23 mg/dL    Creatinine 0.88 0.50 - 1.05 mg/dL    eGFR 62 >60 mL/min/1.73m*2    Calcium 7.9 (L) 8.6 - 10.3 mg/dL   C-reactive protein   Result Value Ref Range    C-Reactive Protein 4.07 (H) <1.00 mg/dL   Sedimentation rate, automated   Result Value Ref Range    Sedimentation Rate 49 (H) 0 - 30 mm/h   CBC   Result Value Ref Range    WBC 13.4 (H) 4.4 - 11.3 x10*3/uL    nRBC 0.0 0.0 - 0.0 /100 WBCs    RBC 3.85 (L) 4.00 - 5.20 x10*6/uL    Hemoglobin 11.2 (L) 12.0 - 16.0 g/dL    Hematocrit 34.7 (L) 36.0 - 46.0 %    MCV 90 80 - 100 fL    MCH 29.1 26.0 - 34.0 pg    MCHC 32.3 32.0 - 36.0 g/dL    RDW 15.3 (H) 11.5 - 14.5 %    Platelets 260 150 - 450 x10*3/uL   Basic metabolic panel   Result Value  Ref Range    Glucose 72 (L) 74 - 99 mg/dL    Sodium 141 136 - 145 mmol/L    Potassium 3.5 3.5 - 5.3 mmol/L    Chloride 106 98 - 107 mmol/L    Bicarbonate 23 21 - 32 mmol/L    Anion Gap 16 10 - 20 mmol/L    Urea Nitrogen 14 6 - 23 mg/dL    Creatinine 0.95 0.50 - 1.05 mg/dL    eGFR 56 (L) >60 mL/min/1.73m*2    Calcium 8.3 (L) 8.6 - 10.3 mg/dL   CBC   Result Value Ref Range    WBC 9.6 4.4 - 11.3 x10*3/uL    nRBC 0.0 0.0 - 0.0 /100 WBCs    RBC 3.71 (L) 4.00 - 5.20 x10*6/uL    Hemoglobin 10.8 (L) 12.0 - 16.0 g/dL    Hematocrit 33.0 (L) 36.0 - 46.0 %    MCV 89 80 - 100 fL    MCH 29.1 26.0 - 34.0 pg    MCHC 32.7 32.0 - 36.0 g/dL    RDW 15.4 (H) 11.5 - 14.5 %    Platelets 243 150 - 450 x10*3/uL   Basic metabolic panel   Result Value Ref Range    Glucose 93 74 - 99 mg/dL    Sodium 140 136 - 145 mmol/L    Potassium 3.4 (L) 3.5 - 5.3 mmol/L    Chloride 106 98 - 107 mmol/L    Bicarbonate 26 21 - 32 mmol/L    Anion Gap 11 10 - 20 mmol/L    Urea Nitrogen 11 6 - 23 mg/dL    Creatinine 0.98 0.50 - 1.05 mg/dL    eGFR 54 (L) >60 mL/min/1.73m*2    Calcium 7.8 (L) 8.6 - 10.3 mg/dL                Assessment/Plan        Principal Problem:    Altered mental status, unspecified altered mental status type  Active Problems:    Thigh abscess  UTI   Advanced dementia  Hypokalemia    Blood cultures negaive   Will dc to home with hospice     See orders  Time > 30 min in discharge planning                Kevon Serrano MD

## 2024-05-18 NOTE — PROGRESS NOTES
Crystal Wilkins is a 89 y.o. female on day 2 of admission presenting with Altered mental status, unspecified altered mental status type.      Subjective   Pt seen dw nursing   She is unable to provide any hx   And is not eatign this morning       Objective     Last Recorded Vitals  /77   Pulse 80   Temp 37.1 °C (98.7 °F)   Resp 18   Wt 52.6 kg (116 lb)   SpO2 94%   Intake/Output last 3 Shifts:    Intake/Output Summary (Last 24 hours) at 5/18/2024 0655  Last data filed at 5/18/2024 0637  Gross per 24 hour   Intake 1550 ml   Output 800 ml   Net 750 ml       Admission Weight  Weight: 52.2 kg (115 lb) (05/15/24 1353)    Daily Weight  05/15/24 : 52.6 kg (116 lb)    Image Results  CT femur right w IV contrast, CT femur left w IV contrast  Narrative: Interpreted By:  Kishan Farfan,   STUDY:  CT FEMUR LEFT W IV CONTRAST; CT FEMUR RIGHT W IV CONTRAST;  5/15/2024  7:11 pm; 5/15/2024 5:21 pm      INDICATION:  Signs/Symptoms:swelling; Signs/Symptoms:swelling medial thigh.      COMPARISON:  None.      ACCESSION NUMBER(S):  [id number]; [id number]      ORDERING CLINICIAN:  ROBERT CAMPOVERDE      TECHNIQUE:  CT imaging of the bilateral thigh/femur was obtained after the  intravenous administration of iodinated contrast. Coronal and  sagittal reformatted images were performed. 3D reconstructed images  were not performed at time of dictation therefore not used during  interpretation.      FINDINGS:  LEFT:  There is a loculated rim enhancing fluid collection within the left  adductor musculature measuring ~ 15.3 cm x 5.5 cm x 3.5 cm. There  are a few internal foci of calcification within the collection. The  collection involves predominantly the pectineus and adductor brevis  muscles and also tracks into the adductor longus muscle. There is no  internal gas. There is diffuse subcutaneous stranding of the medial  left thigh. There is more generalized subcutaneous edema of the left  buttock  and proximal thigh. There is a chronic full-thickness left  hamstring tendon tear predominantly involving the semimembranosus  with advanced fatty atrophy. There is no acute fracture of the left  femur. There is no malalignment left hip. There is mild left hip  osteoarthrosis with small joint effusion. There is moderate left knee  osteoarthrosis with a small joint effusion and loose bodies. There is  dense calcific atherosclerosis of the left common and superficial  femoral arteries. The left common femoral vein, femoral vein,  popliteal vein appear patent.              RIGHT:  There is a 5.5 x 2.5 x 4.4 cm lipoma in the right gluteus neha  muscle. There is advanced right hamstring tendinosis and  calcification. There is no soft tissue gas or foreign body within the  right lower extremity. There is no acute hemorrhage or abscess in the  right thigh. There is advanced fatty atrophy of the semimembranosus  and biceps femoris tendon. There is mild right hip osteoarthrosis  without a significant joint effusion. There is no acute fracture or  malalignment of the right proximal femur. There is moderate  osteoarthrosis of the right knee without significant joint effusion.  There is thick calcific atherosclerosis of the right common and  superficial femoral arteries. The visualized portions of the right  common femoral vein, femoral vein, and popliteal vein are patent.          OTHER:  There is a large amount of impacted stool in rectum distended up to  8.5 x 6.9 cm. The uterus is surgically absent.      Impression: LEFT LOWER EXTREMITY:  1. Loculated rim enhancing fluid collection within the proximal left  adductor musculature measuring 15.3 cm x 5.5 cm x 3.5 cm with  locoregional edema/fat stranding in the overlying fat. Differential  diagnosis includes abscess with adjacent cellulitis or  sterile/infected subacute hematoma status post high-grade muscle  injury. Fluid sampling recommended.  2. Small left hip and left  knee joint effusions without evidence of  osseous destruction or erosive change. These are accompanied by mild  hip and moderate knee osteoarthrosis.  3. No acute fracture of the left femur.      RIGHT LOWER EXTREMITY:  1. No acute osseous abnormality of the right femur.  2. Mild right hip osteoarthrosis and moderate right knee  osteoarthrosis without joint effusions.  3. No acute soft tissue abnormality of the right thigh.          Other:  Large amount of impacted stool in the rectum, distended up to 8.5 x  6.9 cm.      MACRO:  None.      Signed by: Kishan Farfan 5/15/2024 8:01 PM  Dictation workstation:   [id number]  CT brain attack head wo IV contrast  Narrative: Interpreted By:  Con Harris,   STUDY:  CT BRAIN ATTACK HEAD WO IV CONTRAST;  5/15/2024 2:12 pm      INDICATION:  Signs/Symptoms:stroke.      COMPARISON:  CT Brain, 27 September 2023      ACCESSION NUMBER(S):  [id number]      ORDERING CLINICIAN:  DIEGO CAMPOVERDE      TECHNIQUE:  CT of the brain from the skull vertex to the skull base, without  intravenous contrast      FINDINGS:  ACUTE INTRA-AXIAL HEMORRHAGE:  Negative      ACUTE EXTRA-AXIAL/SUBDURAL HEMORRHAGE:  Negative      ACUTE INTRACRANIAL MASS EFFECT:  Negative      CT EVIDENCE OF ACUTE / SUBACUTE TERRITORIAL ISCHEMIA:  Negative      VENTRICLES:  Diffusely prominent for size but in proportion to the  degree of atrophy; no acute pathologic findings      OTHER BRAIN FINDINGS:  No significant interval change to the CT  appearance of the brain including the degree of atrophy and deep  white matter changes from chronic microvascular disease      INCLUDED PARANASAL SINUSES: All clear      INCLUDED MASTOID AIR CELLS: All clear      SKULL:  No lytic or blastic lesion      EXTRACRANIAL SOFT TISSUES:  Scalp and occular globes grossly normal  by CT      Impression: NEGATIVE BRAIN ATTACK PROTOCOL CT BRAIN:      NO ACUTE INTRACRANIAL HEMORRHAGE      NO ACUTE INTRACRANIAL MASS EFFECT      NO  CT EVIDENCE OF A LARGE ACUTE TERRITORIAL INFARCT      I WAS ABLE TO COMMUNICATE THESE FINDINGS BY TELEPHONE TO DIEGO JACOBSEN AT 14 18 HOURS, SAME DAY, 15 MAY 2024      MACRO:  Con Kimberly discussed the significance and urgency of this critical  finding by telephone with  DIEGO CAMPOVERDE on 5/15/2024  at 2:18 pm.  (**-RCF-**) Findings:  See findings.      Signed by: Con Harris 5/15/2024 2:18 PM  Dictation workstation:   [id number]      Physical Exam  Awake, does not follow any commands  Chest clear  CVS regular  Ext no edema  Left medial thigh no redness or induratino  Does not appear to be tender  Cns awake does not follow commands,    Relevant Results  Scheduled medications  FLUoxetine, 20 mg, oral, Daily  heparin (porcine), 5,000 Units, subcutaneous, q8h NIHARIKA  levothyroxine, 25 mcg, oral, Daily  melatonin, 3 mg, oral, Nightly  OLANZapine, 2.5 mg, oral, Nightly  pantoprazole, 40 mg, oral, Daily before breakfast   Or  pantoprazole, 40 mg, intravenous, Daily before breakfast  piperacillin-tazobactam, 3.375 g, intravenous, q6h      Continuous medications  lactated Ringer's, 75 mL/hr, Last Rate: 75 mL/hr (05/18/24 0637)      PRN medications  PRN medications: acetaminophen **OR** acetaminophen **OR** acetaminophen, acetaminophen **OR** acetaminophen **OR** acetaminophen, guaiFENesin, ondansetron **OR** ondansetron, sennosides-docusate sodium  Results for orders placed or performed during the hospital encounter of 05/15/24 (from the past 96 hour(s))   POCT GLUCOSE   Result Value Ref Range    POCT Glucose 172 (H) 74 - 99 mg/dL   Comprehensive metabolic panel   Result Value Ref Range    Glucose 133 (H) 74 - 99 mg/dL    Sodium 138 136 - 145 mmol/L    Potassium 4.7 3.5 - 5.3 mmol/L    Chloride 103 98 - 107 mmol/L    Bicarbonate 26 21 - 32 mmol/L    Anion Gap 14 10 - 20 mmol/L    Urea Nitrogen 19 6 - 23 mg/dL    Creatinine 0.81 0.50 - 1.05 mg/dL    eGFR 68 >60 mL/min/1.73m*2    Calcium 8.1 (L)  8.6 - 10.3 mg/dL    Albumin 3.6 3.4 - 5.0 g/dL    Alkaline Phosphatase 71 33 - 136 U/L    Total Protein 7.0 6.4 - 8.2 g/dL    AST 36 9 - 39 U/L    Bilirubin, Total 0.9 0.0 - 1.2 mg/dL    ALT 32 7 - 45 U/L   CBC and Auto Differential   Result Value Ref Range    WBC 13.0 (H) 4.4 - 11.3 x10*3/uL    nRBC 0.0 0.0 - 0.0 /100 WBCs    RBC 4.34 4.00 - 5.20 x10*6/uL    Hemoglobin 12.5 12.0 - 16.0 g/dL    Hematocrit 38.8 36.0 - 46.0 %    MCV 89 80 - 100 fL    MCH 28.8 26.0 - 34.0 pg    MCHC 32.2 32.0 - 36.0 g/dL    RDW 15.1 (H) 11.5 - 14.5 %    Platelets 292 150 - 450 x10*3/uL    Neutrophils % 67.7 40.0 - 80.0 %    Immature Granulocytes %, Automated 0.4 0.0 - 0.9 %    Lymphocytes % 23.7 13.0 - 44.0 %    Monocytes % 6.8 2.0 - 10.0 %    Eosinophils % 0.9 0.0 - 6.0 %    Basophils % 0.5 0.0 - 2.0 %    Neutrophils Absolute 8.81 (H) 1.60 - 5.50 x10*3/uL    Immature Granulocytes Absolute, Automated 0.05 0.00 - 0.50 x10*3/uL    Lymphocytes Absolute 3.09 (H) 0.80 - 3.00 x10*3/uL    Monocytes Absolute 0.89 (H) 0.05 - 0.80 x10*3/uL    Eosinophils Absolute 0.12 0.00 - 0.40 x10*3/uL    Basophils Absolute 0.06 0.00 - 0.10 x10*3/uL   Troponin I, High Sensitivity   Result Value Ref Range    Troponin I, High Sensitivity 15 (H) 0 - 13 ng/L   Lactate   Result Value Ref Range    Lactate 1.6 0.4 - 2.0 mmol/L   Protime-INR   Result Value Ref Range    Protime 10.2 9.8 - 12.8 seconds    INR 0.9 0.9 - 1.1   Troponin I, High Sensitivity   Result Value Ref Range    Troponin I, High Sensitivity 11 0 - 13 ng/L   Urinalysis with Reflex Microscopic   Result Value Ref Range    Color, Urine Orange (N) Light-Yellow, Yellow, Dark-Yellow    Appearance, Urine Ex.Turbid (N) Clear    Specific Gravity, Urine 1.045 (N) 1.005 - 1.035    pH, Urine 6.0 5.0, 5.5, 6.0, 6.5, 7.0, 7.5, 8.0    Protein, Urine 100 (2+) (A) NEGATIVE, 10 (TRACE), 20 (TRACE) mg/dL    Glucose, Urine Normal Normal mg/dL    Blood, Urine 0.2 (2+) (A) NEGATIVE    Ketones, Urine NEGATIVE NEGATIVE mg/dL     Bilirubin, Urine NEGATIVE NEGATIVE    Urobilinogen, Urine Normal Normal mg/dL    Nitrite, Urine 2+ (A) NEGATIVE    Leukocyte Esterase, Urine 500 Gary/µL (A) NEGATIVE   Microscopic Only, Urine   Result Value Ref Range    WBC, Urine >50 (A) 1-5, NONE /HPF    WBC Clumps, Urine MANY Reference range not established. /HPF    RBC, Urine >20 (A) NONE, 1-2, 3-5 /HPF    Bacteria, Urine 1+ (A) NONE SEEN /HPF   Blood Culture    Specimen: Peripheral Venipuncture; Blood culture   Result Value Ref Range    Blood Culture No growth at 2 days    Blood Culture    Specimen: Peripheral Venipuncture; Blood culture   Result Value Ref Range    Blood Culture No growth at 2 days    CBC   Result Value Ref Range    WBC 15.1 (H) 4.4 - 11.3 x10*3/uL    nRBC 0.7 (H) 0.0 - 0.0 /100 WBCs    RBC 3.91 (L) 4.00 - 5.20 x10*6/uL    Hemoglobin 11.3 (L) 12.0 - 16.0 g/dL    Hematocrit 35.4 (L) 36.0 - 46.0 %    MCV 91 80 - 100 fL    MCH 28.9 26.0 - 34.0 pg    MCHC 31.9 (L) 32.0 - 36.0 g/dL    RDW 15.2 (H) 11.5 - 14.5 %    Platelets 247 150 - 450 x10*3/uL   Basic metabolic panel   Result Value Ref Range    Glucose 94 74 - 99 mg/dL    Sodium 138 136 - 145 mmol/L    Potassium 4.2 3.5 - 5.3 mmol/L    Chloride 106 98 - 107 mmol/L    Bicarbonate 23 21 - 32 mmol/L    Anion Gap 13 10 - 20 mmol/L    Urea Nitrogen 16 6 - 23 mg/dL    Creatinine 0.88 0.50 - 1.05 mg/dL    eGFR 62 >60 mL/min/1.73m*2    Calcium 7.9 (L) 8.6 - 10.3 mg/dL   C-reactive protein   Result Value Ref Range    C-Reactive Protein 4.07 (H) <1.00 mg/dL   Sedimentation rate, automated   Result Value Ref Range    Sedimentation Rate 49 (H) 0 - 30 mm/h   CBC   Result Value Ref Range    WBC 13.4 (H) 4.4 - 11.3 x10*3/uL    nRBC 0.0 0.0 - 0.0 /100 WBCs    RBC 3.85 (L) 4.00 - 5.20 x10*6/uL    Hemoglobin 11.2 (L) 12.0 - 16.0 g/dL    Hematocrit 34.7 (L) 36.0 - 46.0 %    MCV 90 80 - 100 fL    MCH 29.1 26.0 - 34.0 pg    MCHC 32.3 32.0 - 36.0 g/dL    RDW 15.3 (H) 11.5 - 14.5 %    Platelets 260 150 - 450 x10*3/uL    Basic metabolic panel   Result Value Ref Range    Glucose 72 (L) 74 - 99 mg/dL    Sodium 141 136 - 145 mmol/L    Potassium 3.5 3.5 - 5.3 mmol/L    Chloride 106 98 - 107 mmol/L    Bicarbonate 23 21 - 32 mmol/L    Anion Gap 16 10 - 20 mmol/L    Urea Nitrogen 14 6 - 23 mg/dL    Creatinine 0.95 0.50 - 1.05 mg/dL    eGFR 56 (L) >60 mL/min/1.73m*2    Calcium 8.3 (L) 8.6 - 10.3 mg/dL                Assessment/Plan        Principal Problem:    Altered mental status, unspecified altered mental status type  Active Problems:    Thigh abscess  UTI   Advanced dementia    Cont w iv zosyn  Dw ID suspicion for abscess is low  Followup on cultures  Dc plan tomorrow if cultures are negative   Family meeting with hospice              Kevon Serrano MD

## 2024-05-18 NOTE — DISCHARGE SUMMARY
Discharge Diagnosis  Altered mental status, unspecified altered mental status type    Issues Requiring Follow-Up  hospice    Discharge Meds     Your medication list        START taking these medications        Instructions Last Dose Given Next Dose Due   amoxicillin-pot clavulanate 600-42.9 mg/5 mL suspension  Commonly known as: Augmentin      Take 5 mL (600 mg) by mouth every 12 hours for 2 days.              CONTINUE taking these medications        Instructions Last Dose Given Next Dose Due   FLUoxetine 20 mg capsule  Commonly known as: PROzac           levothyroxine 25 mcg tablet  Commonly known as: Synthroid, Levoxyl           multivitamin with minerals iron-free  Commonly known as: Centrum Silver           OLANZapine 2.5 mg tablet  Commonly known as: ZyPREXA                     Where to Get Your Medications        These medications were sent to [id number] - [place], [place] - [address]  [address] [address]      Phone: [phone]   amoxicillin-pot clavulanate 600-42.9 mg/5 mL suspension         Test Results Pending At Discharge  Pending Labs       Order Current Status    Blood Culture Preliminary result    Blood Culture Preliminary result            Hospital Course   89 yr old with advanced dementia   Admitted for change in mental status   Blood culture negative   Urine culture not availabe,   Concern for left thigh abscess low at this time  Pt is going to be discharged to home with hospice    Pertinent Physical Exam At Time of Discharge  Physical Exam    Outpatient Follow-Up  No future appointments.      Kevon Serrano MD

## 2024-05-20 LAB
BACTERIA BLD CULT: NORMAL
BACTERIA BLD CULT: NORMAL